# Patient Record
Sex: FEMALE | Race: WHITE | NOT HISPANIC OR LATINO | URBAN - METROPOLITAN AREA
[De-identification: names, ages, dates, MRNs, and addresses within clinical notes are randomized per-mention and may not be internally consistent; named-entity substitution may affect disease eponyms.]

---

## 2023-03-30 ENCOUNTER — TELEPHONE (OUTPATIENT)
Dept: PRIMARY CARE | Facility: CLINIC | Age: 63
End: 2023-03-30
Payer: MEDICARE

## 2023-03-30 DIAGNOSIS — I10 PRIMARY HYPERTENSION: Primary | ICD-10-CM

## 2023-03-30 RX ORDER — LISINOPRIL 5 MG/1
5 TABLET ORAL DAILY
Qty: 90 TABLET | Refills: 0 | Status: SHIPPED | OUTPATIENT
Start: 2023-03-30 | End: 2023-06-27 | Stop reason: SDUPTHER

## 2023-03-30 RX ORDER — LISINOPRIL 5 MG/1
5 TABLET ORAL DAILY
COMMUNITY
End: 2023-03-30 | Stop reason: SDUPTHER

## 2023-04-13 ENCOUNTER — TELEPHONE (OUTPATIENT)
Dept: PRIMARY CARE | Facility: CLINIC | Age: 63
End: 2023-04-13
Payer: MEDICARE

## 2023-04-13 DIAGNOSIS — F32.A DEPRESSION, UNSPECIFIED DEPRESSION TYPE: Primary | ICD-10-CM

## 2023-04-13 RX ORDER — BUPROPION HYDROCHLORIDE 150 MG/1
150 TABLET ORAL EVERY MORNING
Qty: 30 TABLET | Refills: 1 | Status: SHIPPED | OUTPATIENT
Start: 2023-04-13 | End: 2023-05-06

## 2023-04-13 NOTE — TELEPHONE ENCOUNTER
Patient has been feeling low energy and would like to discuss possibly starting medication Wellbutrin.    Please call [phone]

## 2023-04-25 ENCOUNTER — TELEPHONE (OUTPATIENT)
Dept: PRIMARY CARE | Facility: CLINIC | Age: 63
End: 2023-04-25
Payer: MEDICARE

## 2023-04-25 DIAGNOSIS — U07.1 COVID-19: Primary | ICD-10-CM

## 2023-04-25 NOTE — TELEPHONE ENCOUNTER
Travelling to [place] May 18th. Questions about updating the covid booster.  Advised that she could repeat her booster given that she is 63 and it has been 6 months since her last   Will have her travel with Paxlovid.  Counseled on getting up during the flight frequently and squeezing legs to prevent DVT.  She is feeling better on the Wellbutrin.  Better mood and energy. She feels that her sleep is little more disrupted though. Uses meditation and if needed a xanax.  We agreed to see if this works itself out as her body gets used to the Wellbutrin in a few weeks.  She feels comfortable with this plan

## 2023-05-06 DIAGNOSIS — F32.A DEPRESSION, UNSPECIFIED DEPRESSION TYPE: ICD-10-CM

## 2023-05-06 RX ORDER — BUPROPION HYDROCHLORIDE 150 MG/1
150 TABLET ORAL EVERY MORNING
Qty: 30 TABLET | Refills: 1 | Status: SHIPPED | OUTPATIENT
Start: 2023-05-06 | End: 2023-05-09

## 2023-05-09 ENCOUNTER — TELEPHONE (OUTPATIENT)
Dept: PRIMARY CARE | Facility: CLINIC | Age: 63
End: 2023-05-09
Payer: MEDICARE

## 2023-05-09 DIAGNOSIS — F32.A DEPRESSION, UNSPECIFIED DEPRESSION TYPE: Primary | ICD-10-CM

## 2023-05-09 RX ORDER — BUPROPION HYDROCHLORIDE 300 MG/1
300 TABLET ORAL EVERY MORNING
Qty: 30 TABLET | Refills: 1 | Status: SHIPPED | OUTPATIENT
Start: 2023-05-09 | End: 2023-06-01

## 2023-05-09 NOTE — TELEPHONE ENCOUNTER
Feels like she is back to where she was before starting the Wellbutrin. She is feeling down again. Her sleep did regulate and she is sleeping better. She leaves for [place] a week from Thursday.  She has been on 150 mg for about 4 weeks.  I am going to have her increase to 300 mg and asked that she call me early next week to see if she is tolerating the increased dose.

## 2023-06-01 DIAGNOSIS — F32.A DEPRESSION, UNSPECIFIED DEPRESSION TYPE: ICD-10-CM

## 2023-06-01 RX ORDER — BUPROPION HYDROCHLORIDE 300 MG/1
300 TABLET ORAL EVERY MORNING
Qty: 30 TABLET | Refills: 1 | Status: SHIPPED | OUTPATIENT
Start: 2023-06-01 | End: 2023-06-27

## 2023-06-26 PROBLEM — R73.02 GLUCOSE INTOLERANCE (IMPAIRED GLUCOSE TOLERANCE): Status: ACTIVE | Noted: 2023-06-26

## 2023-06-26 PROBLEM — K59.9 FUNCTIONAL BOWEL DISORDER: Status: ACTIVE | Noted: 2023-06-26

## 2023-06-26 PROBLEM — I10 BENIGN ESSENTIAL HYPERTENSION: Status: ACTIVE | Noted: 2023-06-26

## 2023-06-26 PROBLEM — H40.009 BORDERLINE GLAUCOMA: Status: ACTIVE | Noted: 2023-06-26

## 2023-06-26 PROBLEM — S99.921A INJURY OF FOOT, RIGHT: Status: RESOLVED | Noted: 2023-06-26 | Resolved: 2023-06-26

## 2023-06-26 PROBLEM — F51.02 INSOMNIA DUE TO STRESS: Status: ACTIVE | Noted: 2023-06-26

## 2023-06-26 PROBLEM — M81.0 OSTEOPOROSIS: Status: ACTIVE | Noted: 2023-06-26

## 2023-06-26 PROBLEM — F41.9 ANXIETY DISORDER: Status: RESOLVED | Noted: 2023-06-26 | Resolved: 2023-06-26

## 2023-06-26 RX ORDER — DENOSUMAB 60 MG/ML
60 INJECTION SUBCUTANEOUS ONCE
COMMUNITY
Start: 2019-09-16

## 2023-06-26 RX ORDER — VIT C/E/ZN/COPPR/LUTEIN/ZEAXAN 250MG-90MG
50 CAPSULE ORAL DAILY
COMMUNITY

## 2023-06-26 RX ORDER — ALPRAZOLAM 0.25 MG/1
0.25 TABLET ORAL NIGHTLY
COMMUNITY
End: 2023-06-27 | Stop reason: SDUPTHER

## 2023-06-26 NOTE — PROGRESS NOTES
"Rachael Meredith is a 63 y.o. female who presents Follow-up    HPI:   history of HTN, pre- glaucoma, osteoporosis ( follows with Dr. Mejia) , Functional bowl disorder with bloating, cramping and diarrhea, depression and anxiety  who is being seen for  Medication Management    On 5/9 telephone encounter   Feels like she is back to where she was before starting the Wellbutrin. She is feeling down again. Her sleep did regulate and she is sleeping better. She leaves for [place] a week from Thursday. She has been on 150 mg for about 4 weeks. I am going to have her increase to 300 mg and asked that she call me early next week to see if she is tolerating the increased dose.    Today she reports that Wellbutrin has been \"great for the depression\" but making the anxiety worse. Her friends have noticed.  She is reluctant to go off because it has really helped with the energy and depression.   She finds that she is preservating  more about things. She is still having some sleeping issues. She did not feel well on sertraline in the past.      Her bp at home has been around 127/80.      Her other issue is that lately she feels that she has to clear her throat more and feels like it's hard to take a deep breath outside. She does have more mucus in her sinuses.  She has had some mild hay fever for the past few years   She has not tried any otc medications  for these symptoms .    Problem List as of 6/27/2023 Never Reviewed      Benign essential hypertension    Borderline glaucoma    Glucose intolerance (impaired glucose tolerance)    Insomnia due to stress    Osteoporosis        Patient Active Problem List   Diagnosis    Benign essential hypertension    Borderline glaucoma    Glucose intolerance (impaired glucose tolerance)    Insomnia due to stress    Osteoporosis    Functional bowel disorder        Past Medical History:   Diagnosis Date    Herpes genitalis     History of torn meniscus of right knee     Unilateral primary " osteoarthritis, right knee         Past Surgical History:   Procedure Laterality Date    BREAST BIOPSY      Open     SECTION, CLASSIC      MOUTH SURGERY      Oral Surgery Tooth Extraction        Social History     Tobacco Use    Smoking status: Former     Types: Cigarettes     Quit date:      Years since quittin.5    Smokeless tobacco: Never         Current Outpatient Medications:     buPROPion XL (Wellbutrin XL) 300 mg 24 hr tablet, TAKE 1 TABLET (300 MG) BY MOUTH ONCE DAILY IN THE MORNING. DO NOT CRUSH, CHEW, OR SPLIT., Disp: 30 tablet, Rfl: 1    cholecalciferol (Vitamin D-3) 25 MCG (1000 UT) capsule, Take 2 capsules (50 mcg) by mouth once daily., Disp: , Rfl:     denosumab (Prolia) 60 mg/mL syringe, Inject 1 mL (60 mg) under the skin 1 time., Disp: , Rfl:     ALPRAZolam (Xanax) 0.25 mg tablet, Take 1 tablet (0.25 mg) by mouth as needed at bedtime for anxiety., Disp: 90 tablet, Rfl: 0    escitalopram (Lexapro) 5 mg tablet, Take 1 tablet (5 mg) by mouth once daily., Disp: 30 tablet, Rfl: 2    fexofenadine (Allegra) 180 mg tablet, Take 1 tablet (180 mg) by mouth once daily., Disp: 90 tablet, Rfl: 3    fluticasone (Flonase) 50 mcg/actuation nasal spray, Administer 1 spray into each nostril once daily. Shake gently. Before first use, prime pump. After use, clean tip and replace cap., Disp: 16 g, Rfl: 5    lisinopril 5 mg tablet, Take 1 tablet (5 mg) by mouth once daily., Disp: 90 tablet, Rfl: 0     Allergies   Allergen Reactions    Mirtazapine Other           /81 (BP Location: Left arm, Patient Position: Sitting) Comment: manual  Pulse 52  There is no height or weight on file to calculate BMI.     Physical Exam  Vitals reviewed.   Constitutional:       General: She is not in acute distress.     Appearance: Normal appearance. She is not ill-appearing or toxic-appearing.   HENT:      Right Ear: Tympanic membrane and external ear normal.      Left Ear: Tympanic membrane and external ear normal.       Mouth/Throat:      Mouth: Mucous membranes are moist.      Pharynx: No oropharyngeal exudate or posterior oropharyngeal erythema.   Eyes:      Conjunctiva/sclera: Conjunctivae normal.   Cardiovascular:      Rate and Rhythm: Normal rate and regular rhythm.      Heart sounds: No murmur heard.     No gallop.   Pulmonary:      Effort: Pulmonary effort is normal. No respiratory distress.      Breath sounds: Normal breath sounds. No wheezing, rhonchi or rales.   Musculoskeletal:         General: Normal range of motion.   Lymphadenopathy:      Head:      Right side of head: No submandibular adenopathy.      Left side of head: No submandibular adenopathy.      Cervical: No cervical adenopathy.   Skin:     Findings: No rash.   Neurological:      General: No focal deficit present.      Mental Status: She is alert.           Problem List Items Addressed This Visit    None  Visit Diagnoses       Anxiety    -  Primary    Relevant Medications    escitalopram (Lexapro) 5 mg tablet    ALPRAZolam (Xanax) 0.25 mg tablet    Primary hypertension        Relevant Medications    lisinopril 5 mg tablet    Seasonal allergic rhinitis due to pollen        Relevant Medications    fluticasone (Flonase) 50 mcg/actuation nasal spray    fexofenadine (Allegra) 180 mg tablet             Assessment/Plan   Will re challenge her with an SSRI and have her Start lexapro 5 mg daily in addition to her Wellbutrin  Continue lisinopril but check bp more regularly at home      Sharon Welch MD    TVT of 35 minutes with greater than 50% spent FTF in assessment/discussion and care coordination

## 2023-06-27 ENCOUNTER — OFFICE VISIT (OUTPATIENT)
Dept: PRIMARY CARE | Facility: CLINIC | Age: 63
End: 2023-06-27
Payer: MEDICARE

## 2023-06-27 VITALS — HEART RATE: 52 BPM | DIASTOLIC BLOOD PRESSURE: 81 MMHG | SYSTOLIC BLOOD PRESSURE: 141 MMHG

## 2023-06-27 DIAGNOSIS — I10 PRIMARY HYPERTENSION: ICD-10-CM

## 2023-06-27 DIAGNOSIS — F41.9 ANXIETY: Primary | ICD-10-CM

## 2023-06-27 DIAGNOSIS — F32.A DEPRESSION, UNSPECIFIED DEPRESSION TYPE: ICD-10-CM

## 2023-06-27 DIAGNOSIS — J30.1 SEASONAL ALLERGIC RHINITIS DUE TO POLLEN: ICD-10-CM

## 2023-06-27 PROCEDURE — 3077F SYST BP >= 140 MM HG: CPT | Performed by: INTERNAL MEDICINE

## 2023-06-27 PROCEDURE — 99214 OFFICE O/P EST MOD 30 MIN: CPT | Performed by: INTERNAL MEDICINE

## 2023-06-27 PROCEDURE — 3079F DIAST BP 80-89 MM HG: CPT | Performed by: INTERNAL MEDICINE

## 2023-06-27 PROCEDURE — 1159F MED LIST DOCD IN RCRD: CPT | Performed by: INTERNAL MEDICINE

## 2023-06-27 PROCEDURE — 1160F RVW MEDS BY RX/DR IN RCRD: CPT | Performed by: INTERNAL MEDICINE

## 2023-06-27 PROCEDURE — 1036F TOBACCO NON-USER: CPT | Performed by: INTERNAL MEDICINE

## 2023-06-27 RX ORDER — MINERAL OIL
180 ENEMA (ML) RECTAL DAILY
Qty: 90 TABLET | Refills: 3 | Status: SHIPPED | OUTPATIENT
Start: 2023-06-27 | End: 2024-04-16 | Stop reason: ALTCHOICE

## 2023-06-27 RX ORDER — BUPROPION HYDROCHLORIDE 300 MG/1
300 TABLET ORAL EVERY MORNING
Qty: 90 TABLET | Refills: 3 | Status: SHIPPED | OUTPATIENT
Start: 2023-06-27 | End: 2024-06-26

## 2023-06-27 RX ORDER — FLUTICASONE PROPIONATE 50 MCG
1 SPRAY, SUSPENSION (ML) NASAL DAILY
Qty: 16 G | Refills: 5 | Status: SHIPPED | OUTPATIENT
Start: 2023-06-27 | End: 2023-07-19

## 2023-06-27 RX ORDER — ALPRAZOLAM 0.25 MG/1
0.25 TABLET ORAL NIGHTLY PRN
Qty: 90 TABLET | Refills: 0 | Status: SHIPPED | OUTPATIENT
Start: 2023-06-27 | End: 2023-09-14 | Stop reason: SDUPTHER

## 2023-06-27 RX ORDER — LISINOPRIL 5 MG/1
5 TABLET ORAL DAILY
Qty: 90 TABLET | Refills: 0 | Status: SHIPPED | OUTPATIENT
Start: 2023-06-27 | End: 2023-07-03 | Stop reason: DRUGHIGH

## 2023-06-27 RX ORDER — ESCITALOPRAM OXALATE 5 MG/1
5 TABLET ORAL DAILY
Qty: 30 TABLET | Refills: 2 | Status: SHIPPED | OUTPATIENT
Start: 2023-06-27 | End: 2023-07-19

## 2023-06-27 NOTE — PATIENT INSTRUCTIONS
Call me in 2 weeks with an update on your blood pressure and the new medication ( escitalopram) for anxiety  Follow up with me in 3 months    For more your increased mucous I would have you take daily allegra and Flonase nasal spray for the next two weeks to see if it makes a difference. v

## 2023-07-03 ENCOUNTER — TELEPHONE (OUTPATIENT)
Dept: PRIMARY CARE | Facility: CLINIC | Age: 63
End: 2023-07-03
Payer: MEDICARE

## 2023-07-03 DIAGNOSIS — I10 PRIMARY HYPERTENSION: ICD-10-CM

## 2023-07-03 RX ORDER — LISINOPRIL 5 MG/1
10 TABLET ORAL DAILY
Qty: 90 TABLET | Refills: 0 | Status: SHIPPED | OUTPATIENT
Start: 2023-07-03 | End: 2023-08-15 | Stop reason: SDUPTHER

## 2023-07-03 NOTE — TELEPHONE ENCOUNTER
Patient has been monitoring BP readings.  She is concerned readings have been high.  158/101, 158/88, 156/95    Please call to discuss [phone]

## 2023-07-03 NOTE — TELEPHONE ENCOUNTER
Rachael has been taking her blood pressure and consistently her systolics have been in the 150s to low 160s and diastolics in the 80s or 90s.  She is currently on lisinopril 5 mg.  I have advised her to go up to 10 mg daily and to follow her blood pressure over the next couple weeks.  She will keep me posted on her response.  She has been taking Lexapro 5 mg daily in the morning with her Wellbutrin 300 mg.  She seems to be tolerating the Lexapro.  She is not sure if it is helping with her anxiety at all.  She also is not sleeping very well and will wake up with a queasy nauseous feeling.  She states she is never really slept well since her divorce a couple years ago.  I have encouraged her to stay on the Lexapro for now in the hopes that once her blood pressure is controlled we can increase the dose and her anxiety will be better controlled.  She is going to try to continue.

## 2023-07-11 ENCOUNTER — TELEPHONE (OUTPATIENT)
Dept: PRIMARY CARE | Facility: CLINIC | Age: 63
End: 2023-07-11
Payer: MEDICARE

## 2023-07-11 NOTE — TELEPHONE ENCOUNTER
No pain.  She will see if it continues and will follow up with her dentist for xray and potential oral surgeon consult.   She is tolerating the lexapro 5mg  with food better. Feels less anxious  Her BP has been better controlled other than today it was elevated.  This last week it is normal in the 120s.   She may be concerned about her jaw. I did suggest she take a xanax today. She will continue to monitor her bp

## 2023-07-11 NOTE — TELEPHONE ENCOUNTER
"Patient woke up this morning with \"jaw clicking\", possibly TMJ. Dentist is out of town until next week.    She would like to know if you have any recommendations?    Please call [phone]  "

## 2023-07-14 ENCOUNTER — TELEPHONE (OUTPATIENT)
Dept: PRIMARY CARE | Facility: CLINIC | Age: 63
End: 2023-07-14
Payer: MEDICARE

## 2023-07-14 NOTE — TELEPHONE ENCOUNTER
Rachael fell on her face while she was walking with a friend.  Called her nose is gushing.  She is applying ice and is seeking  on any further action she can do.  She also hit above her eyebrow.  No loss of consciousness.  Cosmetically she is okay with how her nose looks.  I advised she can use Afrin nasal spray continue ice and hold pressure.  To call if the bleeding does not stop.  Or call for any concerns.

## 2023-07-19 DIAGNOSIS — J30.1 SEASONAL ALLERGIC RHINITIS DUE TO POLLEN: ICD-10-CM

## 2023-07-19 DIAGNOSIS — F41.9 ANXIETY: ICD-10-CM

## 2023-07-19 RX ORDER — FLUTICASONE PROPIONATE 50 MCG
SPRAY, SUSPENSION (ML) NASAL
Qty: 16 ML | Refills: 5 | Status: SHIPPED | OUTPATIENT
Start: 2023-07-19 | End: 2023-07-24

## 2023-07-19 RX ORDER — ESCITALOPRAM OXALATE 5 MG/1
TABLET ORAL
Qty: 30 TABLET | Refills: 2 | Status: SHIPPED | OUTPATIENT
Start: 2023-07-19 | End: 2023-09-28 | Stop reason: SDUPTHER

## 2023-07-24 DIAGNOSIS — J30.1 SEASONAL ALLERGIC RHINITIS DUE TO POLLEN: ICD-10-CM

## 2023-07-24 RX ORDER — FLUTICASONE PROPIONATE 50 MCG
2 SPRAY, SUSPENSION (ML) NASAL DAILY
Qty: 16 ML | Refills: 5 | Status: SHIPPED | OUTPATIENT
Start: 2023-07-24 | End: 2024-04-16 | Stop reason: ALTCHOICE

## 2023-08-03 ENCOUNTER — TELEPHONE (OUTPATIENT)
Dept: PRIMARY CARE | Facility: CLINIC | Age: 63
End: 2023-08-03
Payer: MEDICARE

## 2023-08-03 DIAGNOSIS — Z12.31 ENCOUNTER FOR SCREENING MAMMOGRAM FOR MALIGNANT NEOPLASM OF BREAST: Primary | ICD-10-CM

## 2023-08-03 NOTE — TELEPHONE ENCOUNTER
When she checks her bp her first readings are in the 140s and it will usually subsequently go down after the 3rd or 4th try to systolic 120s.   She is tolerating her lisinopril 10 mg well.  No change to medications advised.

## 2023-08-15 DIAGNOSIS — I10 PRIMARY HYPERTENSION: ICD-10-CM

## 2023-08-15 RX ORDER — LISINOPRIL 10 MG/1
10 TABLET ORAL DAILY
Qty: 90 TABLET | Refills: 1 | Status: SHIPPED | OUTPATIENT
Start: 2023-08-15 | End: 2023-09-14 | Stop reason: SINTOL

## 2023-08-24 ENCOUNTER — TELEPHONE (OUTPATIENT)
Dept: PRIMARY CARE | Facility: CLINIC | Age: 63
End: 2023-08-24
Payer: MEDICARE

## 2023-08-24 DIAGNOSIS — R05.9 COUGH, UNSPECIFIED TYPE: Primary | ICD-10-CM

## 2023-08-24 RX ORDER — FAMOTIDINE 40 MG/1
40 TABLET, FILM COATED ORAL NIGHTLY
Qty: 30 TABLET | Refills: 0 | Status: SHIPPED | OUTPATIENT
Start: 2023-08-24 | End: 2023-09-14 | Stop reason: ALTCHOICE

## 2023-08-24 NOTE — TELEPHONE ENCOUNTER
"Cb and discussed   \"Awfully strange\"  Was having just a nocturnal cough - wakes up and coughs for awhile   Trying allegra and Flonase - no improvement  Feels a little phlegm - tries Mucinex  Some throat clearing   No marked heartburn   Will try the following   And stop the other meds - update us if not better through the weekend    Requested Prescriptions     Signed Prescriptions Disp Refills    famotidine (Pepcid) 40 mg tablet 30 tablet 0     Sig: Take 1 tablet (40 mg) by mouth once daily at bedtime.     Authorizing Provider: DION WILLIAMSON       "

## 2023-08-24 NOTE — TELEPHONE ENCOUNTER
Patient has  an on-going cough worse at night, no other symptoms.  She has been taking Allegra and Flonase PRN.    Patient would like to know if any other recommendations?  Please advise  [phone]

## 2023-08-30 ENCOUNTER — TELEPHONE (OUTPATIENT)
Dept: PRIMARY CARE | Facility: CLINIC | Age: 63
End: 2023-08-30
Payer: MEDICARE

## 2023-08-30 DIAGNOSIS — R05.9 COUGH, UNSPECIFIED TYPE: Primary | ICD-10-CM

## 2023-08-30 RX ORDER — AZELASTINE 1 MG/ML
1 SPRAY, METERED NASAL 2 TIMES DAILY
Qty: 30 ML | Refills: 0 | Status: SHIPPED | OUTPATIENT
Start: 2023-08-30 | End: 2023-10-10

## 2023-08-30 RX ORDER — BENZONATATE 100 MG/1
100 CAPSULE ORAL 3 TIMES DAILY PRN
Qty: 30 CAPSULE | Refills: 0 | Status: SHIPPED | OUTPATIENT
Start: 2023-08-30 | End: 2023-09-14 | Stop reason: ALTCHOICE

## 2023-08-30 RX ORDER — IPRATROPIUM BROMIDE 42 UG/1
2 SPRAY, METERED NASAL 2 TIMES DAILY
Qty: 15 ML | Refills: 0 | Status: SHIPPED | OUTPATIENT
Start: 2023-08-30 | End: 2023-09-22

## 2023-08-30 NOTE — TELEPHONE ENCOUNTER
Her cough persists despite the trial of Allegra and Flonase and now she has tried famotidine as well without any relief.  She describes the cough as not dry but more wet and productive.  She does have postnasal drainage she feels during the cough.  She is not sure if she has postnasal drainage in between her coughing fits.  Her cough is worse at night but she does have a few episodes during the day.  She does not feel ill and there is no fevers or chills.  We discussed the potential could be the lisinopril which she has hesitant to come off of because her blood pressure is controlled and she is tolerating it well.  She states she has not completely consistent with the Allegra and may have helped a little.  Given that information we will have her restart her Allegra more consistently we will have her stop her Flonase and try Astelin and ipratropium nasal spray.  I have asked her to give me a call in 2 weeks.  If no improvement in her cough I would like to have her come off her ACE inhibitor and we will put her on an ARB instead.

## 2023-08-30 NOTE — TELEPHONE ENCOUNTER
Patient spoke with dr. Posadas while you were away regarding ongoing cough.  She is still not any better with cough.    Please call  [phone]

## 2023-09-14 ENCOUNTER — OFFICE VISIT (OUTPATIENT)
Dept: PRIMARY CARE | Facility: CLINIC | Age: 63
End: 2023-09-14
Payer: MEDICARE

## 2023-09-14 VITALS
SYSTOLIC BLOOD PRESSURE: 121 MMHG | DIASTOLIC BLOOD PRESSURE: 76 MMHG | OXYGEN SATURATION: 99 % | TEMPERATURE: 98 F | HEART RATE: 62 BPM

## 2023-09-14 DIAGNOSIS — I10 PRIMARY HYPERTENSION: Primary | ICD-10-CM

## 2023-09-14 DIAGNOSIS — F41.9 ANXIETY: ICD-10-CM

## 2023-09-14 PROCEDURE — 1160F RVW MEDS BY RX/DR IN RCRD: CPT | Performed by: INTERNAL MEDICINE

## 2023-09-14 PROCEDURE — 3074F SYST BP LT 130 MM HG: CPT | Performed by: INTERNAL MEDICINE

## 2023-09-14 PROCEDURE — 1159F MED LIST DOCD IN RCRD: CPT | Performed by: INTERNAL MEDICINE

## 2023-09-14 PROCEDURE — 99213 OFFICE O/P EST LOW 20 MIN: CPT | Performed by: INTERNAL MEDICINE

## 2023-09-14 PROCEDURE — 1036F TOBACCO NON-USER: CPT | Performed by: INTERNAL MEDICINE

## 2023-09-14 PROCEDURE — 3078F DIAST BP <80 MM HG: CPT | Performed by: INTERNAL MEDICINE

## 2023-09-14 RX ORDER — CALCIUM CARBONATE/VITAMIN D3 500-10/5ML
LIQUID (ML) ORAL
COMMUNITY

## 2023-09-14 RX ORDER — LOSARTAN POTASSIUM 25 MG/1
25 TABLET ORAL DAILY
Qty: 30 TABLET | Refills: 0 | Status: SHIPPED | OUTPATIENT
Start: 2023-09-14 | End: 2023-09-28 | Stop reason: SDUPTHER

## 2023-09-14 RX ORDER — ALPRAZOLAM 0.25 MG/1
0.25 TABLET ORAL NIGHTLY PRN
Qty: 90 TABLET | Refills: 0 | Status: SHIPPED | OUTPATIENT
Start: 2023-09-14 | End: 2024-04-16 | Stop reason: SDUPTHER

## 2023-09-14 RX ORDER — MULTIVITAMIN
1 TABLET ORAL
COMMUNITY

## 2023-09-14 RX ORDER — VALACYCLOVIR HYDROCHLORIDE 1 G/1
TABLET, FILM COATED ORAL
COMMUNITY
Start: 2023-09-13

## 2023-09-14 NOTE — PROGRESS NOTES
Rachael Meredith is a 63 y.o. female who presents Cough (persistent)      HPI: Rachael presents for persistent cough and for medication management of her xanax.  She is doing quite well on the Lexapro 5 mg.  She has been able to decrease the amount of Xanax that she uses at night to sleep.  She is taking it now approximately 4 times a week.  However she has been more exhausted because of a cough has been keeping her up at night.  The nasal sprays and the Pepcid and the Tessalon have been of no relief.  She does feel that she has a lot of nasal congestion and drainage without any discoloration.  The drainage is thin.  She has no other allergic symptoms like itchy watery eyes.  No wheezing.    She is blowing her nose a lot and she has to clear her nose afterwards  Cough started 6 weeks ago and has gotten progressively worse.     4 nights a week will take a half.    Problem List as of 2023 Reviewed: 2023 10:55 AM by Sharon Welch MD      Benign essential hypertension    Borderline glaucoma    Glucose intolerance (impaired glucose tolerance)    Insomnia due to stress    Osteoporosis    Functional bowel disorder        Patient Active Problem List   Diagnosis    Benign essential hypertension    Borderline glaucoma    Glucose intolerance (impaired glucose tolerance)    Insomnia due to stress    Osteoporosis    Functional bowel disorder    Osteoporosis, post-menopausal        Past Medical History:   Diagnosis Date    Herpes genitalis     History of torn meniscus of right knee     Unilateral primary osteoarthritis, right knee         Past Surgical History:   Procedure Laterality Date    BREAST BIOPSY      Open     SECTION, CLASSIC      MOUTH SURGERY      Oral Surgery Tooth Extraction        Social History     Tobacco Use    Smoking status: Former     Types: Cigarettes     Quit date:      Years since quittin.7    Smokeless tobacco: Never         Current Outpatient Medications:     azelastine (Astelin) 137  mcg (0.1 %) nasal spray, Administer 1 spray into each nostril 2 times a day. Use in each nostril as directed, Disp: 30 mL, Rfl: 0    buPROPion XL (Wellbutrin XL) 300 mg 24 hr tablet, Take 1 tablet (300 mg) by mouth once daily in the morning. Do not crush, chew, or split., Disp: 90 tablet, Rfl: 3    calcium carbonate-vitamin D3 600 mg-10 mcg (400 unit) tablet, Take 1 tablet by mouth once daily., Disp: , Rfl:     cholecalciferol (Vitamin D-3) 25 MCG (1000 UT) capsule, Take 2 capsules (50 mcg) by mouth once daily., Disp: , Rfl:     denosumab (Prolia) 60 mg/mL syringe, Inject 1 mL (60 mg) under the skin 1 time., Disp: , Rfl:     escitalopram (Lexapro) 5 mg tablet, TAKE 1 TABLET BY MOUTH EVERY DAY, Disp: 30 tablet, Rfl: 2    fexofenadine (Allegra) 180 mg tablet, Take 1 tablet (180 mg) by mouth once daily., Disp: 90 tablet, Rfl: 3    fluticasone (Flonase) 50 mcg/actuation nasal spray, Administer 2 sprays into each nostril once daily., Disp: 16 mL, Rfl: 5    ipratropium (Atrovent) 42 mcg (0.06 %) nasal spray, Administer 2 sprays into each nostril 2 times a day for 19 days., Disp: 15 mL, Rfl: 0    magnesium oxide 400 mg magnesium capsule, Take by mouth once daily., Disp: , Rfl:     valACYclovir (Valtrex) 1 gram tablet, , Disp: , Rfl:     ALPRAZolam (Xanax) 0.25 mg tablet, Take 1 tablet (0.25 mg) by mouth as needed at bedtime for anxiety., Disp: 90 tablet, Rfl: 0    losartan (Cozaar) 25 mg tablet, Take 1 tablet (25 mg) by mouth once daily., Disp: 30 tablet, Rfl: 0     Allergies   Allergen Reactions    Mirtazapine Other       Review of Systems     /76 (BP Location: Left arm, Patient Position: Sitting)   Pulse 62   Temp 36.7 °C (98 °F)   SpO2 99%  There is no height or weight on file to calculate BMI.     Physical Exam  Constitutional:       Appearance: Normal appearance.   HENT:      Nose: Nose normal. No congestion or rhinorrhea.      Mouth/Throat:      Mouth: Mucous membranes are moist.      Pharynx: Oropharynx is  clear.   Eyes:      Extraocular Movements: Extraocular movements intact.      Conjunctiva/sclera: Conjunctivae normal.   Neck:      Thyroid: No thyroid mass, thyromegaly or thyroid tenderness.   Cardiovascular:      Rate and Rhythm: Normal rate and regular rhythm.   Pulmonary:      Effort: Pulmonary effort is normal.      Breath sounds: Normal breath sounds.   Musculoskeletal:      Cervical back: Normal range of motion and neck supple.   Lymphadenopathy:      Cervical:      Right cervical: No posterior cervical adenopathy.     Left cervical: No posterior cervical adenopathy.   Skin:     General: Skin is warm and dry.   Neurological:      Mental Status: She is alert.           Problem List Items Addressed This Visit    None  Visit Diagnoses       Primary hypertension    -  Primary    Relevant Medications    losartan (Cozaar) 25 mg tablet    Anxiety        Relevant Medications    ALPRAZolam (Xanax) 0.25 mg tablet             Assessment/Plan   Persistent cough now for at least 6 weeks.  It very well could be an ACE inhibitor related cough.  She does not appear to have any infection or lung process going on.  It is interesting because of the increased nasal congestion.  We will trial her off of the ACE inhibitor and put her on losartan.  She will follow her blood pressure closely at home and let me know in the next couple weeks how her blood pressure and cough are doing.  If despite coming off the lisinopril her cough persists will consider a CAT scan of her sinuses and chest x-ray.      Sharon Welch MD    TVT of 30 minutes with greater than 50% spent FTF in assessment/discussion and care coordination

## 2023-09-18 ENCOUNTER — APPOINTMENT (OUTPATIENT)
Dept: PRIMARY CARE | Facility: CLINIC | Age: 63
End: 2023-09-18
Payer: MEDICARE

## 2023-09-22 DIAGNOSIS — R05.9 COUGH, UNSPECIFIED TYPE: ICD-10-CM

## 2023-09-22 RX ORDER — IPRATROPIUM BROMIDE 42 UG/1
2 SPRAY, METERED NASAL 2 TIMES DAILY
Qty: 45 ML | Refills: 3 | Status: SHIPPED | OUTPATIENT
Start: 2023-09-22 | End: 2024-04-16 | Stop reason: ALTCHOICE

## 2023-09-28 ENCOUNTER — TELEPHONE (OUTPATIENT)
Dept: PRIMARY CARE | Facility: CLINIC | Age: 63
End: 2023-09-28
Payer: MEDICARE

## 2023-09-28 DIAGNOSIS — I10 PRIMARY HYPERTENSION: ICD-10-CM

## 2023-09-28 DIAGNOSIS — F41.9 ANXIETY: ICD-10-CM

## 2023-09-28 RX ORDER — ESCITALOPRAM OXALATE 5 MG/1
5 TABLET ORAL DAILY
Qty: 90 TABLET | Refills: 3 | Status: SHIPPED | OUTPATIENT
Start: 2023-09-28 | End: 2024-09-27

## 2023-09-28 RX ORDER — LOSARTAN POTASSIUM 25 MG/1
25 TABLET ORAL DAILY
Qty: 90 TABLET | Refills: 3 | Status: SHIPPED | OUTPATIENT
Start: 2023-09-28 | End: 2023-11-28 | Stop reason: ALTCHOICE

## 2023-09-28 NOTE — PROGRESS NOTES
The losartan seems to be controlling her blood pressure appropriately.  She is not sure if her cough was from the lisinopril because she still has it although it does seem improved.  It really has only been a couple weeks and the cough can take up to 4 weeks to improve so we will continue to follow her cough expectantly.  She also needs a refill of her Lexapro.  She has been feeling well on 5 mg along with her Wellbutrin 300 mg

## 2023-10-10 DIAGNOSIS — R05.9 COUGH, UNSPECIFIED TYPE: ICD-10-CM

## 2023-10-10 RX ORDER — AZELASTINE 1 MG/ML
1 SPRAY, METERED NASAL 2 TIMES DAILY
Qty: 30 ML | Refills: 3 | Status: SHIPPED | OUTPATIENT
Start: 2023-10-10 | End: 2023-10-11 | Stop reason: ALTCHOICE

## 2023-10-26 ENCOUNTER — TELEPHONE (OUTPATIENT)
Dept: PRIMARY CARE | Facility: CLINIC | Age: 63
End: 2023-10-26
Payer: MEDICARE

## 2023-10-26 DIAGNOSIS — M54.50 LUMBAR PAIN WITH RADIATION DOWN BOTH LEGS: Primary | ICD-10-CM

## 2023-10-26 DIAGNOSIS — M79.604 LUMBAR PAIN WITH RADIATION DOWN BOTH LEGS: Primary | ICD-10-CM

## 2023-10-26 DIAGNOSIS — M79.605 LUMBAR PAIN WITH RADIATION DOWN BOTH LEGS: Primary | ICD-10-CM

## 2023-10-26 RX ORDER — PREDNISONE 10 MG/1
TABLET ORAL
Qty: 32 TABLET | Refills: 0 | Status: SHIPPED | OUTPATIENT
Start: 2023-10-26 | End: 2023-11-28 | Stop reason: ALTCHOICE

## 2023-10-26 NOTE — TELEPHONE ENCOUNTER
Patient has been having leg pain x month since her fall.  She been having trouble walking.    She wants to discuss with you  Please call  [phone]

## 2023-10-26 NOTE — TELEPHONE ENCOUNTER
Rachael calls with complaints of lower back pain with bilateral radicular pain.  She reports pain in her butt cheeks with walking and which radiates into her hamstrings and into her calf behind her knee.  Her hamstrings also feel very tight.  Her symptoms are worse first thing in the morning and with prolonged sitting although they are constantly present.  She has tried to do exercises from [employer].  Her symptoms do respond to Advil and some degree.  She would like to get back to her normal exercise routine which is running.  Denies any bowel or bladder changes or overt weakness.  I would like her to start formal physical therapy we will get plain x-rays and do a prednisone taper.  If no improvement will bring her into the office for a more detailed exam and consider further imaging.  She will call with any worsening or new concerns to be seen sooner.

## 2023-10-27 ENCOUNTER — TELEPHONE (OUTPATIENT)
Dept: PRIMARY CARE | Facility: CLINIC | Age: 63
End: 2023-10-27
Payer: MEDICARE

## 2023-10-27 NOTE — TELEPHONE ENCOUNTER
She was concerned about side effects specifically bloating and gaining weight.  Was wondering whether she should change the regimen in any ways.  She is on a 15-day taper.  I advised that she should try it and if she starts getting side effects we can make adjustments but I like her to be on the longer taper given how long the process has been going on.  She is willing to give it a try and will keep in touch with me.

## 2023-10-31 ENCOUNTER — ANCILLARY PROCEDURE (OUTPATIENT)
Dept: RADIOLOGY | Facility: CLINIC | Age: 63
End: 2023-10-31
Payer: MEDICARE

## 2023-10-31 DIAGNOSIS — M79.605 LUMBAR PAIN WITH RADIATION DOWN BOTH LEGS: ICD-10-CM

## 2023-10-31 DIAGNOSIS — M79.604 LUMBAR PAIN WITH RADIATION DOWN BOTH LEGS: ICD-10-CM

## 2023-10-31 DIAGNOSIS — M54.50 LUMBAR PAIN WITH RADIATION DOWN BOTH LEGS: ICD-10-CM

## 2023-10-31 PROCEDURE — 72100 X-RAY EXAM L-S SPINE 2/3 VWS: CPT

## 2023-10-31 PROCEDURE — 72100 X-RAY EXAM L-S SPINE 2/3 VWS: CPT | Performed by: RADIOLOGY

## 2023-11-01 ENCOUNTER — TELEPHONE (OUTPATIENT)
Dept: PRIMARY CARE | Facility: CLINIC | Age: 63
End: 2023-11-01
Payer: MEDICARE

## 2023-11-01 NOTE — TELEPHONE ENCOUNTER
Patient cut her finger last night, wrapped it with a bandage and was still bleeding.    She would like to know if she needs to go to urgent care or how to wrap it properly.    Please call [phone]

## 2023-11-01 NOTE — TELEPHONE ENCOUNTER
Spoke with Rachael. Not bleeding through the bandage.  Shaved her tip off..  advised her to use a non stick dressing over the finger then pressure. Soap and water.  If if is not stopping bleeding advised  [facility] for dental sponge, clotting dressing.

## 2023-11-13 ENCOUNTER — ANCILLARY PROCEDURE (OUTPATIENT)
Dept: RADIOLOGY | Facility: CLINIC | Age: 63
End: 2023-11-13
Payer: MEDICARE

## 2023-11-13 DIAGNOSIS — M81.0 AGE-RELATED OSTEOPOROSIS WITHOUT CURRENT PATHOLOGICAL FRACTURE: ICD-10-CM

## 2023-11-13 PROCEDURE — 77080 DXA BONE DENSITY AXIAL: CPT | Mod: LIO | Performed by: RADIOLOGY

## 2023-11-13 PROCEDURE — 77080 DXA BONE DENSITY AXIAL: CPT | Mod: LIO

## 2023-11-27 ENCOUNTER — TELEPHONE (OUTPATIENT)
Dept: PRIMARY CARE | Facility: CLINIC | Age: 63
End: 2023-11-27
Payer: MEDICARE

## 2023-11-28 ENCOUNTER — LAB (OUTPATIENT)
Dept: LAB | Facility: LAB | Age: 63
End: 2023-11-28
Payer: MEDICARE

## 2023-11-28 ENCOUNTER — APPOINTMENT (OUTPATIENT)
Dept: PHYSICAL THERAPY | Facility: CLINIC | Age: 63
End: 2023-11-28
Payer: MEDICARE

## 2023-11-28 ENCOUNTER — OFFICE VISIT (OUTPATIENT)
Dept: PRIMARY CARE | Facility: CLINIC | Age: 63
End: 2023-11-28
Payer: MEDICARE

## 2023-11-28 VITALS
HEART RATE: 60 BPM | OXYGEN SATURATION: 98 % | DIASTOLIC BLOOD PRESSURE: 84 MMHG | TEMPERATURE: 97.2 F | SYSTOLIC BLOOD PRESSURE: 131 MMHG

## 2023-11-28 DIAGNOSIS — M79.10 MYALGIA: ICD-10-CM

## 2023-11-28 DIAGNOSIS — M79.10 MYALGIA: Primary | ICD-10-CM

## 2023-11-28 DIAGNOSIS — I10 BENIGN ESSENTIAL HYPERTENSION: ICD-10-CM

## 2023-11-28 LAB
ALBUMIN SERPL BCP-MCNC: 4.7 G/DL (ref 3.4–5)
ALP SERPL-CCNC: 32 U/L (ref 33–136)
ALT SERPL W P-5'-P-CCNC: 28 U/L (ref 7–45)
ANION GAP SERPL CALC-SCNC: 15 MMOL/L (ref 10–20)
AST SERPL W P-5'-P-CCNC: 21 U/L (ref 9–39)
BASOPHILS # BLD AUTO: 0.03 X10*3/UL (ref 0–0.1)
BASOPHILS NFR BLD AUTO: 0.5 %
BILIRUB SERPL-MCNC: 0.3 MG/DL (ref 0–1.2)
BUN SERPL-MCNC: 15 MG/DL (ref 6–23)
CALCIUM SERPL-MCNC: 9.9 MG/DL (ref 8.6–10.6)
CCP IGG SERPL-ACNC: <1 U/ML
CHLORIDE SERPL-SCNC: 96 MMOL/L (ref 98–107)
CK SERPL-CCNC: 133 U/L (ref 0–215)
CO2 SERPL-SCNC: 27 MMOL/L (ref 21–32)
CREAT SERPL-MCNC: 0.8 MG/DL (ref 0.5–1.05)
CRP SERPL-MCNC: <0.1 MG/DL
EOSINOPHIL # BLD AUTO: 0.11 X10*3/UL (ref 0–0.7)
EOSINOPHIL NFR BLD AUTO: 2 %
ERYTHROCYTE [DISTWIDTH] IN BLOOD BY AUTOMATED COUNT: 12.9 % (ref 11.5–14.5)
ERYTHROCYTE [SEDIMENTATION RATE] IN BLOOD BY WESTERGREN METHOD: 4 MM/H (ref 0–30)
GFR SERPL CREATININE-BSD FRML MDRD: 82 ML/MIN/1.73M*2
GLUCOSE SERPL-MCNC: 95 MG/DL (ref 74–99)
HCT VFR BLD AUTO: 40.2 % (ref 36–46)
HGB BLD-MCNC: 13.3 G/DL (ref 12–16)
IMM GRANULOCYTES # BLD AUTO: 0.01 X10*3/UL (ref 0–0.7)
IMM GRANULOCYTES NFR BLD AUTO: 0.2 % (ref 0–0.9)
LYMPHOCYTES # BLD AUTO: 1.35 X10*3/UL (ref 1.2–4.8)
LYMPHOCYTES NFR BLD AUTO: 24.7 %
MCH RBC QN AUTO: 28.5 PG (ref 26–34)
MCHC RBC AUTO-ENTMCNC: 33.1 G/DL (ref 32–36)
MCV RBC AUTO: 86 FL (ref 80–100)
MONOCYTES # BLD AUTO: 0.54 X10*3/UL (ref 0.1–1)
MONOCYTES NFR BLD AUTO: 9.9 %
NEUTROPHILS # BLD AUTO: 3.43 X10*3/UL (ref 1.2–7.7)
NEUTROPHILS NFR BLD AUTO: 62.7 %
NRBC BLD-RTO: 0 /100 WBCS (ref 0–0)
PLATELET # BLD AUTO: 276 X10*3/UL (ref 150–450)
POTASSIUM SERPL-SCNC: 4.5 MMOL/L (ref 3.5–5.3)
PROT SERPL-MCNC: 6.7 G/DL (ref 6.4–8.2)
PROT SERPL-MCNC: 6.7 G/DL (ref 6.4–8.2)
RBC # BLD AUTO: 4.67 X10*6/UL (ref 4–5.2)
RHEUMATOID FACT SER NEPH-ACNC: <10 IU/ML (ref 0–15)
SODIUM SERPL-SCNC: 133 MMOL/L (ref 136–145)
TSH SERPL-ACNC: 1.75 MIU/L (ref 0.44–3.98)
WBC # BLD AUTO: 5.5 X10*3/UL (ref 4.4–11.3)

## 2023-11-28 PROCEDURE — 3079F DIAST BP 80-89 MM HG: CPT | Performed by: INTERNAL MEDICINE

## 2023-11-28 PROCEDURE — 82550 ASSAY OF CK (CPK): CPT

## 2023-11-28 PROCEDURE — 3075F SYST BP GE 130 - 139MM HG: CPT | Performed by: INTERNAL MEDICINE

## 2023-11-28 PROCEDURE — 36415 COLL VENOUS BLD VENIPUNCTURE: CPT

## 2023-11-28 PROCEDURE — 85652 RBC SED RATE AUTOMATED: CPT

## 2023-11-28 PROCEDURE — 86334 IMMUNOFIX E-PHORESIS SERUM: CPT

## 2023-11-28 PROCEDURE — 86320 SERUM IMMUNOELECTROPHORESIS: CPT | Performed by: STUDENT IN AN ORGANIZED HEALTH CARE EDUCATION/TRAINING PROGRAM

## 2023-11-28 PROCEDURE — 84165 PROTEIN E-PHORESIS SERUM: CPT | Performed by: STUDENT IN AN ORGANIZED HEALTH CARE EDUCATION/TRAINING PROGRAM

## 2023-11-28 PROCEDURE — 86200 CCP ANTIBODY: CPT

## 2023-11-28 PROCEDURE — 82085 ASSAY OF ALDOLASE: CPT

## 2023-11-28 PROCEDURE — 86140 C-REACTIVE PROTEIN: CPT

## 2023-11-28 PROCEDURE — 84165 PROTEIN E-PHORESIS SERUM: CPT

## 2023-11-28 PROCEDURE — 80053 COMPREHEN METABOLIC PANEL: CPT

## 2023-11-28 PROCEDURE — 85025 COMPLETE CBC W/AUTO DIFF WBC: CPT

## 2023-11-28 PROCEDURE — 1159F MED LIST DOCD IN RCRD: CPT | Performed by: INTERNAL MEDICINE

## 2023-11-28 PROCEDURE — 84443 ASSAY THYROID STIM HORMONE: CPT

## 2023-11-28 PROCEDURE — 1160F RVW MEDS BY RX/DR IN RCRD: CPT | Performed by: INTERNAL MEDICINE

## 2023-11-28 PROCEDURE — 86431 RHEUMATOID FACTOR QUANT: CPT

## 2023-11-28 PROCEDURE — 86038 ANTINUCLEAR ANTIBODIES: CPT

## 2023-11-28 PROCEDURE — 99214 OFFICE O/P EST MOD 30 MIN: CPT | Performed by: INTERNAL MEDICINE

## 2023-11-28 PROCEDURE — 1036F TOBACCO NON-USER: CPT | Performed by: INTERNAL MEDICINE

## 2023-11-28 RX ORDER — LOSARTAN POTASSIUM 50 MG/1
50 TABLET ORAL DAILY
Qty: 90 TABLET | Refills: 3 | Status: SHIPPED | OUTPATIENT
Start: 2023-11-28 | End: 2024-11-27

## 2023-11-28 NOTE — PROGRESS NOTES
"Subjective   Patient ID: Rachael Meredith is a 63 y.o. female who presents for Back Pain.    HPI  Presents for back pain   Fell while running in June/July  and after her facial trauma healed she noticed that her lower back was bothering in August.  Her pain moves in different places.  Some days better than others.  Movement helps.  Wakes up in am and feels \"crippled\".  She always has an underlying pain.  Posterior hips/ Pelvic area. Working on Core helps.  After she walks the dog she starts to feeling more functional.  Lower back and on side she has a \"trigger point\" on left side of shoulder area but both tender.  Sometimes whole neck bothers her.   On 10/26 Rachael called with complaints of lower back pain with bilateral radicular pain.  She reported pain in her butt cheeks with walking and which radiates into her hamstrings and into her calf behind her knee.  Her hamstrings also feel very tight.  Her symptoms worse first thing in the morning and with prolonged sitting although they are constantly present.  She has tried to do exercises from [employer].  Her symptoms do respond to Advil and some degree.  She is most concerned about not being able to get back to her normal exercise routine which is running.  Denies any bowel or bladder changes or overt weakness.  At that time I asked her to  to start formal physical therapy we  got plain x-rays which showed min. Arthritic changes.   She was started on  a prednisone taper which helped immediately  while she was taking it but now she continues to have pain but in different areas and needed to stop the taper due to feeling more agitated on it.  She has been off prednisone now for about 4 weeks.  She presents today for a more detailed exam and to consider further imaging and testing.      Also her Bp has been high at home until she can get it to 132/83.      Review of Systems    Objective   /84   Pulse 60   Temp 36.2 °C (97.2 °F)   SpO2 98%     Physical " Exam  Constitutional:       Appearance: Normal appearance.   HENT:      Head: Normocephalic and atraumatic.      Comments: No Temporal tenderness or pulsations   Eyes:      Extraocular Movements: Extraocular movements intact.      Conjunctiva/sclera: Conjunctivae normal.   Cardiovascular:      Rate and Rhythm: Normal rate and regular rhythm.      Heart sounds: Normal heart sounds.   Pulmonary:      Effort: Pulmonary effort is normal.      Breath sounds: Normal breath sounds and air entry.   Musculoskeletal:      Right shoulder: Tenderness present. Normal strength.      Left shoulder: Tenderness present. Normal strength.        Arms:       Cervical back: Normal range of motion and neck supple.      Lumbar back: Normal. Negative right straight leg raise test and negative left straight leg raise test.        Back:       Right hip: Normal. Normal range of motion.      Left hip: Normal. Normal range of motion.      Comments: Areas of tenderness    Skin:     Findings: No rash.   Neurological:      General: No focal deficit present.      Mental Status: She is alert.      Cranial Nerves: Cranial nerves 2-12 are intact.      Sensory: Sensation is intact.      Motor: Motor function is intact.      Gait: Gait is intact.      Deep Tendon Reflexes: Reflexes are normal and symmetric.           Assessment/Plan   As discussed we are evaluating you for a condition called PMR  Please obtain lab work today to help with that diagnosis    Your blood pressure is above goal of Less than 130/80  consistently. Increase your Losartan to 50 mg    Will follow up by phone after your lab work comes in.   Problem List Items Addressed This Visit             ICD-10-CM    Benign essential hypertension I10    Relevant Medications    losartan (Cozaar) 50 mg tablet    Myalgia - Primary M79.10    Relevant Orders    Comprehensive metabolic panel    Sedimentation rate, automated    C-reactive protein    CBC and Auto Differential    Rheumatoid factor     Tsh With Reflex To Free T4 If Abnormal    Citrulline Antibody, IgG    AMADOU with Reflex to RACHEL    Serum Protein Electrophoresis    CK    Aldolase

## 2023-11-28 NOTE — PATIENT INSTRUCTIONS
As discussed we are evaluating you for a condition called PMR  Please obtain lab work today to help with that diagnosis    Your blood pressure is above goal of Less than 130/80  consistently. Increase your Losartan to 50 mg    Will follow up by phone after your lab work comes in.

## 2023-11-30 LAB — ALDOLASE SERPL-CCNC: 2.7 U/L (ref 1.2–7.6)

## 2023-12-01 ENCOUNTER — TELEPHONE (OUTPATIENT)
Dept: PRIMARY CARE | Facility: CLINIC | Age: 63
End: 2023-12-01
Payer: MEDICARE

## 2023-12-01 DIAGNOSIS — M54.17 LUMBOSACRAL RADICULOPATHY: Primary | ICD-10-CM

## 2023-12-01 NOTE — TELEPHONE ENCOUNTER
Reviewed lab results with Rachael which does not support a diagnosis of PMR.  Despite 6 weeks of physical therapy and compliance with her exercises and stretches, and despite anti-inflammatory she is considered to have a significant amount of discomfort that is limiting her ability to exercise and do her regular routine.  At this point I think it is indicated to go ahead and get an MRI to further evaluate the cause of her back and leg pain.

## 2023-12-03 LAB — ANA SER QL HEP2 SUBST: NEGATIVE

## 2023-12-05 ENCOUNTER — APPOINTMENT (OUTPATIENT)
Dept: PHYSICAL THERAPY | Facility: CLINIC | Age: 63
End: 2023-12-05
Payer: MEDICARE

## 2023-12-05 LAB
ALBUMIN: 4.4 G/DL (ref 3.4–5)
ALPHA 1 GLOBULIN: 0.2 G/DL (ref 0.2–0.6)
ALPHA 2 GLOBULIN: 0.7 G/DL (ref 0.4–1.1)
BETA GLOBULIN: 0.7 G/DL (ref 0.5–1.2)
GAMMA GLOBULIN: 0.6 G/DL (ref 0.5–1.4)
IMMUNOFIXATION COMMENT: NORMAL
M-PROTEIN 1: 0 G/DL
PATH REVIEW - SERUM IMMUNOFIXATION: NORMAL
PATH REVIEW-SERUM PROTEIN ELECTROPHORESIS: NORMAL
PROTEIN ELECTROPHORESIS COMMENT: NORMAL

## 2023-12-15 ENCOUNTER — HOSPITAL ENCOUNTER (OUTPATIENT)
Dept: RADIOLOGY | Facility: HOSPITAL | Age: 63
Discharge: HOME | End: 2023-12-15
Payer: MEDICARE

## 2023-12-15 DIAGNOSIS — M54.17 LUMBOSACRAL RADICULOPATHY: ICD-10-CM

## 2023-12-15 PROCEDURE — 72148 MRI LUMBAR SPINE W/O DYE: CPT

## 2023-12-16 DIAGNOSIS — M54.16 LUMBAR RADICULOPATHY: Primary | ICD-10-CM

## 2023-12-16 NOTE — PROGRESS NOTES
Continues with significant pain in L3 area.  Has failed PT. Will refer to Pain management for GIOVANA and further management

## 2024-01-03 ENCOUNTER — TELEPHONE (OUTPATIENT)
Dept: PRIMARY CARE | Facility: CLINIC | Age: 64
End: 2024-01-03
Payer: MEDICARE

## 2024-01-04 ENCOUNTER — OFFICE VISIT (OUTPATIENT)
Dept: PAIN MEDICINE | Facility: HOSPITAL | Age: 64
End: 2024-01-04
Payer: MEDICARE

## 2024-01-04 DIAGNOSIS — M54.16 LUMBAR RADICULOPATHY: ICD-10-CM

## 2024-01-04 PROCEDURE — 99204 OFFICE O/P NEW MOD 45 MIN: CPT | Performed by: ANESTHESIOLOGY

## 2024-01-04 PROCEDURE — 99214 OFFICE O/P EST MOD 30 MIN: CPT | Performed by: ANESTHESIOLOGY

## 2024-01-04 PROCEDURE — 1125F AMNT PAIN NOTED PAIN PRSNT: CPT | Performed by: ANESTHESIOLOGY

## 2024-01-04 PROCEDURE — 1036F TOBACCO NON-USER: CPT | Performed by: ANESTHESIOLOGY

## 2024-01-04 RX ORDER — GABAPENTIN 300 MG/1
CAPSULE ORAL
Qty: 90 CAPSULE | Refills: 2 | Status: SHIPPED | OUTPATIENT
Start: 2024-01-04

## 2024-01-04 ASSESSMENT — PAIN SCALES - GENERAL: PAINLEVEL: 5

## 2024-01-04 NOTE — PROGRESS NOTES
Chief Complaint   Patient presents with    Back Pain    Leg Pain     Subjective   Patient ID: Rachael Meredith is a 63 y.o. female with a past medical history of Hypertension, glaucoma, prediabetes, osteoporosis, anxiety who is seen as a new patient in clinic for evaluation of back and leg pain.     HPI:   She was referred by her primary care doctor for evaluation of back pain that has persisted despite more than 6 weeks of physical therapy in the past 6 months.  Last July she had a fall while running and sustained injuries to her face and back.  She had some back pain during that time which exacerbated in August.  She describes lower back pain that radiates to bilateral posterior thighs and intermittently into her calves, though this has been less of a problem lately.    Her pain is exacerbated with walking and running, lifting to some degree, sitting, and transitioning from sitting to standing, and rolling over in bed at night.  The pain is not made worse with truncal flexion or extension.  Denies any significant accidents.    Physical Therapy: The patient has done six or more weeks of physical therapy in the past six months with minimal improvement  Other Conservative Measures she has tried: Heating Pad and Ice  Classes of medications tried in the past: Acetaminophen, NSAIDs, and oral steroids    Last Urine Drug Screen:  Recent Results (from the past 8760 hour(s))   OPIATE/OPIOID/BENZO PRESCRIPTION COMPLIANCE    Collection Time: 02/03/23 10:10 AM   Result Value Ref Range    DRUG SCREEN COMMENT URINE SEE BELOW     Creatine, Urine 82.2 mg/dL    Amphetamine Screen, Urine PRESUMPTIVE NEGATIVE NEGATIVE    Barbiturate Screen, Urine PRESUMPTIVE NEGATIVE NEGATIVE    Cannabinoid Screen, Urine PRESUMPTIVE NEGATIVE NEGATIVE    Cocaine Screen, Urine PRESUMPTIVE NEGATIVE NEGATIVE    PCP Screen, Urine PRESUMPTIVE NEGATIVE NEGATIVE    7-Aminoclonazepam <25 Cutoff <25 ng/mL    Alpha-Hydroxyalprazolam <25 Cutoff <25 ng/mL     Alpha-Hydroxymidazolam <25 Cutoff <25 ng/mL    Alprazolam <25 Cutoff <25 ng/mL    Chlordiazepoxide <25 Cutoff <25 ng/mL    Clonazepam <25 Cutoff <25 ng/mL    Diazepam <25 Cutoff <25 ng/mL    Lorazepam <25 Cutoff <25 ng/mL    Midazolam <25 Cutoff <25 ng/mL    Nordiazepam <25 Cutoff <25 ng/mL    Oxazepam <25 Cutoff <25 ng/mL    Temazepam <25 Cutoff <25 ng/mL    Zolpidem <25 Cutoff <25 ng/mL    Zolpidem Metabolite (ZCA) <25 Cutoff <25 ng/mL    6-Acetylmorphine <25 Cutoff <25 ng/mL    Codeine <50 Cutoff <50 ng/mL    Hydrocodone <25 Cutoff <25 ng/mL    Hydromorphone <25 Cutoff <25 ng/mL    Morphine Urine <50 Cutoff <50 ng/mL    Norhydrocodone <25 Cutoff <25 ng/mL    Noroxycodone <25 Cutoff <25 ng/mL    Oxycodone <25 Cutoff <25 ng/mL    Oxymorphone <25 Cutoff <25 ng/mL    Tramadol 906 (A) Cutoff <50 ng/mL    O-Desmethyltramadol >1000 (A) Cutoff <50 ng/mL    Fentanyl <2.5 Cutoff<2.5 ng/mL    Norfentanyl <2.5 Cutoff<2.5 ng/mL    METHADONE CONFIRMATION,URINE <25 Cutoff <25 ng/mL    EDDP <25 Cutoff <25 ng/mL       Review of Systems   13-point ROS done and negative except for HPI.     Current Outpatient Medications   Medication Instructions    ALPRAZolam (XANAX) 0.25 mg, oral, Nightly PRN    buPROPion XL (WELLBUTRIN XL) 300 mg, oral, Every morning, Do not crush, chew, or split.    calcium carbonate-vitamin D3 600 mg-10 mcg (400 unit) tablet 1 tablet, oral, Daily RT    cholecalciferol (VITAMIN D-3) 50 mcg, oral, Daily    escitalopram (LEXAPRO) 5 mg, oral, Daily    fexofenadine (ALLEGRA) 180 mg, oral, Daily    fluticasone (Flonase) 50 mcg/actuation nasal spray 2 sprays, Each Nostril, Daily    gabapentin (Neurontin) 300 mg capsule Day 1-5 300mg at bedtime, Day 6-10 600mg at bedtime; if needed, increase on Day 11 and after to 900mg at bedtime. Decrease one tablet at a time if excessively drowsy or fatigued.    ipratropium (Atrovent) 42 mcg (0.06 %) nasal spray 2 sprays, Each Nostril, 2 times daily    losartan (COZAAR) 50 mg, oral,  Daily    magnesium oxide 400 mg magnesium capsule oral, Daily RT    Prolia 60 mg, subcutaneous, Once    valACYclovir (Valtrex) 1 gram tablet        Past Medical History:   Diagnosis Date    Herpes genitalis     History of torn meniscus of right knee     Unilateral primary osteoarthritis, right knee         Past Surgical History:   Procedure Laterality Date    BREAST BIOPSY      Open     SECTION, CLASSIC      MOUTH SURGERY      Oral Surgery Tooth Extraction        No family history on file.     Allergies   Allergen Reactions    Mirtazapine Other        Objective     There were no vitals filed for this visit.     Physical Exam  General: NAD, well groomed, well nourished  Eyes: Non-icteric sclera, EOMI  Ears, Nose, Mouth, and Throat: External ears and nose appear to be without deformity or rash. No lesions or masses noted. Hearing is grossly intact.   Neck: Trachea midline  Respiratory: Nonlabored breathing   Cardiovascular: no peripheral edema   Skin: No rashes or open lesions/ulcers identified on skin.    Back:   Palpation: No tenderness to palpation over lumbar paraspinous muscles.   Straight leg raise: does not reproduce their pain, bilaterally   SI Joint: Pain with Gaenselen, Pain with ELVIE, Pain with Yeoman's , Matthew finger sign present, and on the LEFT    Hip: No pain over greater trochanters. and Pain not reproduced with hip internal/external rotation.     Neurologic:   Cranial nerves grossly intact.   Strength 5/5 and symmetric plantar/dorsiflexion   Sensation: Normal to light touch throughout  DTRs:normal and symmetric throughout  Hernandez: absent  Clonus: absent    Psychiatric: Alert, orientation to person, place, and time. Cooperative.    Imaging personally reviewed and independently interpreted: Lumbar MRI shows disc bulges at L3/4 and L4/5, grade 1 anterolisthesis of L3 on L4 mild left L3 foraminal narrowing moderate central canal stenosis at L3/4 with a disc hyperintensity zone at that level on  the right.     Assessment/Plan   63-year-old female with lumbar radiculitis due to L3-4 disc herniation and hyperintensity zone more prominent on the left on MRI.  She also has a component of left sacroiliac joint dysfunction.  This pain is severely limiting her ability to engage in her hobbies which include running and other forms of exercise and is causing her to be depressed due to her feelings of lack of activity.  She has failed physical therapy as well as multiple classes of medications.    Plan:  -Gabapentin 300 mg 1 to 2 capsules nightly.  Side effect profile and risk reviewed.  -Start with transforaminal epidural steroid injection; if no benefit or still residual pain will pursue left-sided sacroiliac joint injection.  Procedure, risk, benefits, alternatives reviewed with the patient.  -Encouraged to keep up with physical therapy and core strengthening exercise program.    Planned Procedure: Left L3 transforaminal epidural steroid injection  The patient has failed treatment with : Physical therapy , alternative therapies, have significant limitations of their quality of life due to the pain, and have significant impairments of their instrumental activities of daily living (IADLs) due to the pain    We discussed  the risks, benefits and alternatives of the procedure including but not limited to: , Paralysis, Epidural hematoma, Post-dural puncture headache, Lack of efficacy , Transiently worsening pain , Bleeding, Infection , Nerve Damage, and The patient or her decision-maker expressed understanding and agreed to proceed. All questions were answered to the best of my ability.     Follow up: After procedure    The patient was invited to contact us back anytime with any questions or concerns and follow-up with us in the office as needed.     Diagnoses and all orders for this visit:  Lumbar radiculopathy  -     Referral to Pain Medicine  -     [place] pain management TC; Future  -     Transforaminal; Future  -      gabapentin (Neurontin) 300 mg capsule; Day 1-5 300mg at bedtime, Day 6-10 600mg at bedtime; if needed, increase on Day 11 and after to 900mg at bedtime. Decrease one tablet at a time if excessively drowsy or fatigued.    This note was generated with the aid of dictation software, there may be typos despite my attempts at proofreading.

## 2024-01-12 ENCOUNTER — HOSPITAL ENCOUNTER (OUTPATIENT)
Dept: RADIOLOGY | Facility: HOSPITAL | Age: 64
Discharge: HOME | End: 2024-01-12
Payer: MEDICARE

## 2024-01-12 VITALS
SYSTOLIC BLOOD PRESSURE: 134 MMHG | RESPIRATION RATE: 18 BRPM | DIASTOLIC BLOOD PRESSURE: 64 MMHG | TEMPERATURE: 98.1 F | HEIGHT: 62 IN | OXYGEN SATURATION: 100 % | WEIGHT: 108 LBS | HEART RATE: 61 BPM | BODY MASS INDEX: 19.88 KG/M2

## 2024-01-12 DIAGNOSIS — M54.16 LUMBAR RADICULOPATHY: ICD-10-CM

## 2024-01-12 PROCEDURE — 64483 NJX AA&/STRD TFRM EPI L/S 1: CPT | Mod: 50 | Performed by: ANESTHESIOLOGY

## 2024-01-12 PROCEDURE — 77003 FLUOROGUIDE FOR SPINE INJECT: CPT

## 2024-01-12 PROCEDURE — 2500000004 HC RX 250 GENERAL PHARMACY W/ HCPCS (ALT 636 FOR OP/ED): Performed by: ANESTHESIOLOGY

## 2024-01-12 PROCEDURE — 64483 NJX AA&/STRD TFRM EPI L/S 1: CPT | Performed by: ANESTHESIOLOGY

## 2024-01-12 RX ORDER — MIDAZOLAM HYDROCHLORIDE 1 MG/ML
INJECTION INTRAMUSCULAR; INTRAVENOUS
Status: COMPLETED | OUTPATIENT
Start: 2024-01-12 | End: 2024-01-12

## 2024-01-12 RX ADMIN — MIDAZOLAM HYDROCHLORIDE 2 MG: 1 INJECTION INTRAMUSCULAR; INTRAVENOUS at 08:47

## 2024-01-12 ASSESSMENT — PAIN - FUNCTIONAL ASSESSMENT
PAIN_FUNCTIONAL_ASSESSMENT: 0-10

## 2024-01-12 ASSESSMENT — PAIN SCALES - GENERAL
PAINLEVEL_OUTOF10: 0 - NO PAIN
PAINLEVEL_OUTOF10: 7
PAINLEVEL_OUTOF10: 0 - NO PAIN

## 2024-01-12 NOTE — H&P
History Of Present Illness  Rachael Meredith is a 63 y.o. female presents for procedure state below. Endorses no changes in past medical history or medical health since last seen in clinic.     She stopped taking her gabapentin due to side effects--insomnia. She feels her pain more on the left, but is now getting pain on the right as well.     Past Medical History  She has a past medical history of Herpes genitalis, History of torn meniscus of right knee, and Unilateral primary osteoarthritis, right knee.    Surgical History  She has a past surgical history that includes  section, classic; Mouth surgery; and Breast biopsy.     Social History  She reports that she quit smoking about 42 years ago. Her smoking use included cigarettes. She has never used smokeless tobacco. No history on file for alcohol use and drug use.    Family History  No family history on file.     Allergies  Mirtazapine    Review of Symptoms:   Constitutional: Negative for chills, diaphoresis or fever  HENT: Negative for neck swelling  Eyes:.  Negative for eye pain  Respiratory:.  Negative for cough, shortness of breath or wheezing    Cardiovascular:.  Negative for chest pain or palpitations  Gastrointestinal:.  Negative for abdominal pain, nausea and vomiting  Genitourinary:.  Negative for urgency  Musculoskeletal:  Positive for back pain. Positive for joint pain. Denies falls within the past 3 months.  Skin: Negative for wounds or itching   Neurological: Negative for dizziness, seizures, loss of consciousness and weakness  Endo/Heme/Allergies: Does not bruise/bleed easily  Psychiatric/Behavioral: Negative for depression. The patient does not appear anxious.       PHYSICAL EXAM  Vitals signs reviewed  Constitutional:       General: Not in acute distress     Appearance: Normal appearance. Not ill-appearing.  HENT:     Head: Normocephalic and atraumatic  Eyes:     Conjunctiva/sclera: Conjunctivae normal  Cardiovascular:     Rate and Rhythm: Normal  rate and regular rhythm  Pulmonary:     Effort: No respiratory distress  Abdominal:     Palpations: Abdomen is soft  Musculoskeletal: ORELLANA  Skin:     General: Skin is warm and dry  Neurological:     General: No focal deficit present  Psychiatric:         Mood and Affect: Mood normal         Behavior: Behavior normal     Last Recorded Vitals  There were no vitals taken for this visit.    Relevant Results  Current Outpatient Medications   Medication Instructions    ALPRAZolam (XANAX) 0.25 mg, oral, Nightly PRN    buPROPion XL (WELLBUTRIN XL) 300 mg, oral, Every morning, Do not crush, chew, or split.    calcium carbonate-vitamin D3 600 mg-10 mcg (400 unit) tablet 1 tablet, oral, Daily RT    cholecalciferol (VITAMIN D-3) 50 mcg, oral, Daily    escitalopram (LEXAPRO) 5 mg, oral, Daily    fexofenadine (ALLEGRA) 180 mg, oral, Daily    fluticasone (Flonase) 50 mcg/actuation nasal spray 2 sprays, Each Nostril, Daily    gabapentin (Neurontin) 300 mg capsule Day 1-5 300mg at bedtime, Day 6-10 600mg at bedtime; if needed, increase on Day 11 and after to 900mg at bedtime. Decrease one tablet at a time if excessively drowsy or fatigued.    ipratropium (Atrovent) 42 mcg (0.06 %) nasal spray 2 sprays, Each Nostril, 2 times daily    losartan (COZAAR) 50 mg, oral, Daily    magnesium oxide 400 mg magnesium capsule oral, Daily RT    Prolia 60 mg, subcutaneous, Once    valACYclovir (Valtrex) 1 gram tablet          MR lumbar spine wo IV contrast 12/15/2023    Narrative  Interpreted By:  Julio Cesar Fox,  STUDY:  MR LUMBAR SPINE WO IV CONTRAST; 12/15/2023 11:24 am    INDICATION:  persistent leg pain and lower back pain;    COMPARISON:  None available.    ACCESSION NUMBER(S):  [id number]    ORDERING CLINICIAN:  DAYNE POP    TECHNIQUE:  Routine MRI of the lumbar spine was performed without contrast    FINDINGS:  RESULT:  Counting reference: Lumbosacral junction.    Alignment: Alignment is anatomic.    Bone marrow signal/fracture: No  evidence of pathologic marrow  infiltration. No evidence of prior fracture.    Conus: The conus is within normal limits of signal intensity and  morphology. The conus terminates normally at L1.    Paraspinal soft tissues: Paraspinal soft tissues are unremarkable.    Lower thoracic spine: Visualized lower thoracic canal and foramina  are patent.    L1-L2: Canal and foramina are patent.    L2-L3: Canal and foramina are patent    L3-L4: Left central disc extrusion and left foraminal disc protrusion  is noted. There is mild left-sided neural foraminal narrowing. The  protrusion contacts the exiting left L3 nerve root.    L4-L5: Canal and foramina are patent    L5-S1: Canal and foramina are patent    Sacrum and iliac wings: The visualized sacrum and iliac wings are  within normal limits.  The presacral soft tissues are normal in  appearance.    Impression  Lumbar spondylosis most pronounced at the L3-4 level with left  central disc extrusion left foraminal disc protrusion. There is  contact of the exiting left L3 nerve root.    Signed by: Julio Cesar Fox 12/15/2023 5:55 PM  Dictation workstation:   [id number]     No image results found.       1. Lumbar radiculopathy  [facility] pain management TC    [facility] pain management TC    Transforaminal    Transforaminal           ASSESSMENT/PLAN  Rachael Meredith is a 63 y.o. female presents for bilateral L3 transforaminal epidural steroid injection     Our plan is as follows:  - Follow In pain clinic  - Continue to participate in physical therapy as well as physician directed home exercises  - Continue pain medications as prescribed       Royal Leyva MD

## 2024-01-12 NOTE — PROCEDURES
Preoperative diagnosis:  Lumbar radiculitis  Postoperative diagnosis:  Lumbar radiculitis, lumbar disc disease, stenosis  Procedure: Bilateral L3 lumbar transforaminal epidural steroid injection under fluoroscopic guidance  Surgeon: Magali Zurita  Assistant:  Fellow, Cj Cisneros  Anesthesia: Local plus IV sedation  Complications: Apparently none    Clinical note: Ms. Meredith is a white female with a history of back and leg symptoms, now bilateral who presents to have a bilateral epidural steroid injection.  She has failed other conservative measures for her pain.    Procedure note: The patient was met in the preoperative holding area after risks benefits and alternatives to procedure were discussed with the patient, informed consent was obtained. Patient brought back to the procedure room and placed in the prone position on the fluoroscopy table. Area over the back was exposed, prepped, draped, in the usual sterile fashion.  Skin and subcutaneous tissues to the neuroforamen was anesthetized using 0.5% lidocaine.  22-gauge Sprotte needles were inserted in the skin and advanced into the foramen. Needle tip position was confirmed in AP oblique and lateral view.  Contrast was injected which showed appropriate epidural spread on the right but the left needle had to be slightly medialized to get the best epidural spread.  In the final position there was good epidural spread bilaterally and no intravascular or intrathecal uptake. A total of 2 mL of 0.5% lidocaine mixed with 10 mg dexamethasone was injected in divided doses among the 2 needles. Needles removed, bandage applied, patient tolerated the procedure well with no immediate complications.

## 2024-01-22 DIAGNOSIS — M54.16 LUMBAR RADICULOPATHY: ICD-10-CM

## 2024-02-05 ENCOUNTER — HOSPITAL ENCOUNTER (OUTPATIENT)
Dept: RADIOLOGY | Facility: HOSPITAL | Age: 64
Discharge: HOME | End: 2024-02-05
Payer: MEDICARE

## 2024-02-05 VITALS
RESPIRATION RATE: 16 BRPM | WEIGHT: 110 LBS | SYSTOLIC BLOOD PRESSURE: 126 MMHG | OXYGEN SATURATION: 100 % | HEIGHT: 62 IN | BODY MASS INDEX: 20.24 KG/M2 | TEMPERATURE: 98.1 F | DIASTOLIC BLOOD PRESSURE: 74 MMHG | HEART RATE: 52 BPM

## 2024-02-05 DIAGNOSIS — M54.16 LUMBAR RADICULOPATHY: ICD-10-CM

## 2024-02-05 PROCEDURE — 64483 NJX AA&/STRD TFRM EPI L/S 1: CPT | Mod: 50 | Performed by: ANESTHESIOLOGY

## 2024-02-05 PROCEDURE — 77003 FLUOROGUIDE FOR SPINE INJECT: CPT

## 2024-02-05 PROCEDURE — 64483 NJX AA&/STRD TFRM EPI L/S 1: CPT | Performed by: ANESTHESIOLOGY

## 2024-02-05 PROCEDURE — 2500000004 HC RX 250 GENERAL PHARMACY W/ HCPCS (ALT 636 FOR OP/ED): Performed by: ANESTHESIOLOGY

## 2024-02-05 RX ORDER — MIDAZOLAM HYDROCHLORIDE 1 MG/ML
INJECTION INTRAMUSCULAR; INTRAVENOUS
Status: COMPLETED | OUTPATIENT
Start: 2024-02-05 | End: 2024-02-05

## 2024-02-05 RX ADMIN — MIDAZOLAM HYDROCHLORIDE 2 MG: 1 INJECTION INTRAMUSCULAR; INTRAVENOUS at 08:55

## 2024-02-05 ASSESSMENT — PAIN SCALES - GENERAL
PAINLEVEL_OUTOF10: 7
PAINLEVEL_OUTOF10: 0 - NO PAIN
PAINLEVEL_OUTOF10: 2
PAINLEVEL_OUTOF10: 0 - NO PAIN
PAINLEVEL_OUTOF10: 3

## 2024-02-05 ASSESSMENT — PAIN - FUNCTIONAL ASSESSMENT
PAIN_FUNCTIONAL_ASSESSMENT: 0-10

## 2024-02-05 NOTE — PROCEDURES
Preoperative diagnosis:  Lumbar radiculitis  Postoperative diagnosis:  Lumbar radiculitis, spinal stenosis, disc disease  Procedure: Bilateral L3 transforaminal epidural steroid injection under fluoroscopic guidance with methylprednisolone  Surgeon: Magali Zurita  Assistant:  Fellow, Clara Castillo  Anesthesia: Local plus IV sedation  Complications: Apparently none    Clinical note: Ms. Meredith is a 63-year-old female with history of back and leg symptoms and known lumbar disc disease and stenosis worst at the L3-4 level.  We discussed using a different steroid called methylprednisolone.  If we do not see sustained relief following this procedure then we will have a follow-up visit in the office.    Procedure note: The patient was met in the preoperative holding area after risks benefits and alternatives to procedure were discussed with the patient, informed consent was obtained. Patient brought back to the procedure room and placed in the prone position on the fluoroscopy table. Area over the back was exposed, prepped, draped, in the usual sterile fashion.  Skin and subcutaneous tissues to the neuroforamen was anesthetized using 0.5% lidocaine.  Two 90 mm 22-gauge Sprotte needles were inserted in the skin and advanced into the foramen. Needle tip position was confirmed in AP oblique and lateral views.  The left-sided needle was slightly more anteriorly once we are in the lateral view.  In the final position, contrast was injected which showed appropriate epidural spread, no intravascular or intrathecal uptake. A total of 2 mL of 0.5% lidocaine mixed with 40 mg of methylprednisolone was injected in divided doses among the 2 needles. Needles removed, bandage applied, patient tolerated the procedure well with no immediate complications.

## 2024-02-05 NOTE — H&P
"History Of Present Illness  Rachael Meredith is a 63 y.o. female presenting with bilateral low back pain.     Past Medical History  Past Medical History:   Diagnosis Date    Herpes genitalis     History of torn meniscus of right knee     Unilateral primary osteoarthritis, right knee        Surgical History  Past Surgical History:   Procedure Laterality Date    BREAST BIOPSY      Open     SECTION, CLASSIC      MOUTH SURGERY      Oral Surgery Tooth Extraction        Social History  She reports that she quit smoking about 42 years ago. Her smoking use included cigarettes. She has never used smokeless tobacco. She reports current alcohol use. She reports that she does not use drugs.    Family History  No family history on file.     Allergies  Mirtazapine    Review of Systems   13 point ROS done and negative except for the above.   Physical Exam     General: NAD, well nourished   Eyes: Non-icteric sclera, EOMI  Ears, Nose, Mouth, and Throat: External ears and nose appear to be without deformity or rash. No lesions or masses noted. Hearing is grossly intact.   Neck: Trachea midline  Respiratory: Nonlabored breathing   Cardiovascular: No JVD  Skin: No rashes or open lesions/ulcers identified on skin.    Last Recorded Vitals  Blood pressure 141/80, pulse 56, temperature 36.5 °C (97.7 °F), temperature source Temporal, resp. rate 17, height 1.575 m (5' 2\"), weight 49.9 kg (110 lb), SpO2 100 %.    Relevant Results           Assessment/Plan   Problem List Items Addressed This Visit    None  Visit Diagnoses         Codes    Lumbar radiculopathy     M54.16    Relevant Orders    [facility] pain management TC    Transforaminal            Bilateral L3 lumbar transforaminal epidural steroid injection with Depo methylprednisolone.    Risks, benefits, alternatives to the procedure were discussed in detail and patient was amenable to proceeding.    Osvaldo Womack MD   "

## 2024-03-14 ENCOUNTER — TELEPHONE (OUTPATIENT)
Dept: PRIMARY CARE | Facility: CLINIC | Age: 64
End: 2024-03-14
Payer: MEDICARE

## 2024-03-14 NOTE — TELEPHONE ENCOUNTER
Patient called and stated she tested positive for covid this morning.  She says she does not feel too bad but wants to know if she should be taking Paxlovid?    [phone]

## 2024-03-19 DIAGNOSIS — Z00.00 HEALTHCARE MAINTENANCE: ICD-10-CM

## 2024-04-05 ENCOUNTER — LAB (OUTPATIENT)
Dept: LAB | Facility: LAB | Age: 64
End: 2024-04-05
Payer: MEDICARE

## 2024-04-05 DIAGNOSIS — Z00.00 HEALTHCARE MAINTENANCE: ICD-10-CM

## 2024-04-05 LAB
25(OH)D3 SERPL-MCNC: 59 NG/ML (ref 31–100)
ALBUMIN SERPL BCP-MCNC: 4.5 G/DL (ref 3.4–5)
ALP SERPL-CCNC: 29 U/L (ref 33–136)
ALT SERPL W P-5'-P-CCNC: 22 U/L (ref 7–45)
ANION GAP SERPL CALC-SCNC: 13 MMOL/L (ref 10–20)
APPEARANCE UR: CLEAR
AST SERPL W P-5'-P-CCNC: 21 U/L (ref 9–39)
BASOPHILS # BLD AUTO: 0.01 X10*3/UL (ref 0–0.1)
BASOPHILS NFR BLD AUTO: 0.2 %
BILIRUB SERPL-MCNC: 0.4 MG/DL (ref 0–1.2)
BILIRUB UR STRIP.AUTO-MCNC: NEGATIVE MG/DL
BUN SERPL-MCNC: 16 MG/DL (ref 6–23)
CALCIUM SERPL-MCNC: 9.7 MG/DL (ref 8.6–10.3)
CHLORIDE SERPL-SCNC: 98 MMOL/L (ref 98–107)
CHOLEST SERPL-MCNC: 240 MG/DL (ref 133–200)
CHOLEST/HDLC SERPL: 2.3 {RATIO}
CO2 SERPL-SCNC: 26 MMOL/L (ref 21–32)
COLOR UR: YELLOW
CREAT SERPL-MCNC: 0.83 MG/DL (ref 0.5–1.05)
CRP SERPL HS-MCNC: 0.5 MG/L
EGFRCR SERPLBLD CKD-EPI 2021: 78 ML/MIN/1.73M*2
EOSINOPHIL # BLD AUTO: 0.11 X10*3/UL (ref 0–0.7)
EOSINOPHIL NFR BLD AUTO: 2.1 %
ERYTHROCYTE [DISTWIDTH] IN BLOOD BY AUTOMATED COUNT: 13.1 % (ref 11.5–14.5)
EST. AVERAGE GLUCOSE BLD GHB EST-MCNC: 120 MG/DL
GLUCOSE SERPL-MCNC: 106 MG/DL (ref 74–99)
GLUCOSE UR STRIP.AUTO-MCNC: NEGATIVE MG/DL
HBA1C MFR BLD: 5.8 %
HCT VFR BLD AUTO: 39.2 % (ref 36–46)
HDLC SERPL-MCNC: 105 MG/DL
HGB BLD-MCNC: 12.7 G/DL (ref 12–16)
HOLD SPECIMEN: NORMAL
IMM GRANULOCYTES # BLD AUTO: 0.02 X10*3/UL (ref 0–0.7)
IMM GRANULOCYTES NFR BLD AUTO: 0.4 % (ref 0–0.9)
KETONES UR STRIP.AUTO-MCNC: NEGATIVE MG/DL
LDLC SERPL CALC-MCNC: 121 MG/DL (ref 65–130)
LEUKOCYTE ESTERASE UR QL STRIP.AUTO: NEGATIVE
LYMPHOCYTES # BLD AUTO: 1.55 X10*3/UL (ref 1.2–4.8)
LYMPHOCYTES NFR BLD AUTO: 29.4 %
MCH RBC QN AUTO: 28.2 PG (ref 26–34)
MCHC RBC AUTO-ENTMCNC: 32.4 G/DL (ref 32–36)
MCV RBC AUTO: 87 FL (ref 80–100)
MONOCYTES # BLD AUTO: 0.52 X10*3/UL (ref 0.1–1)
MONOCYTES NFR BLD AUTO: 9.8 %
NEUTROPHILS # BLD AUTO: 3.07 X10*3/UL (ref 1.2–7.7)
NEUTROPHILS NFR BLD AUTO: 58.1 %
NITRITE UR QL STRIP.AUTO: NEGATIVE
NRBC BLD-RTO: NORMAL /100{WBCS}
PH UR STRIP.AUTO: 6.5 [PH]
PLATELET # BLD AUTO: 257 X10*3/UL (ref 150–450)
POTASSIUM SERPL-SCNC: 4.8 MMOL/L (ref 3.5–5.3)
PROT SERPL-MCNC: 6.7 G/DL (ref 6.4–8.2)
PROT UR STRIP.AUTO-MCNC: NEGATIVE MG/DL
RBC # BLD AUTO: 4.5 X10*6/UL (ref 4–5.2)
RBC # UR STRIP.AUTO: NEGATIVE /UL
SODIUM SERPL-SCNC: 132 MMOL/L (ref 136–145)
SP GR UR STRIP.AUTO: 1.01
TRIGL SERPL-MCNC: 70 MG/DL (ref 40–150)
TSH SERPL DL<=0.05 MIU/L-ACNC: 1.58 MIU/L (ref 0.27–4.2)
UROBILINOGEN UR STRIP.AUTO-MCNC: 0.2 MG/DL
WBC # BLD AUTO: 5.3 X10*3/UL (ref 4.4–11.3)

## 2024-04-05 PROCEDURE — 36415 COLL VENOUS BLD VENIPUNCTURE: CPT

## 2024-04-05 PROCEDURE — 86141 C-REACTIVE PROTEIN HS: CPT

## 2024-04-05 PROCEDURE — 80053 COMPREHEN METABOLIC PANEL: CPT

## 2024-04-05 PROCEDURE — 83036 HEMOGLOBIN GLYCOSYLATED A1C: CPT

## 2024-04-05 PROCEDURE — 80061 LIPID PANEL: CPT

## 2024-04-05 PROCEDURE — 81003 URINALYSIS AUTO W/O SCOPE: CPT

## 2024-04-05 PROCEDURE — 84443 ASSAY THYROID STIM HORMONE: CPT

## 2024-04-05 PROCEDURE — 82306 VITAMIN D 25 HYDROXY: CPT

## 2024-04-05 PROCEDURE — 85025 COMPLETE CBC W/AUTO DIFF WBC: CPT

## 2024-04-12 NOTE — PROGRESS NOTES
"Physical Exam    Name Rachael Meredith    Date of Service :4/16/2024      Rachael Meredith is a 64 y.o. year old female who is being seen for a comprehensive exam.   history of HTN, pre- glaucoma, osteoporosis ( follows with Dr. Armenta. On Prolia) , Functional bowl disorder with bloating, cramping and diarrhea, history depression and anxiety now in remission.    She is still having lower back pain and had 2 tonio without relief. Last one was in Feb.  PT has not helped despite stretches. When she wakes up she is very \"crippled\".  She has to stretch and as she starts to move it gets better. She has a gelling phenomenon. She cannot do any high intensity cardio. She can't go as fast or long as she really starts to hurt.  She is currently not interested in any more injections or surgery. She did find the prednisone taper helpful in the past however she did feel more agitated on it.   Her pain is described as Mostly in the buttocks and down her hamstrings. Pain and stiffness in the SI joints and pain down both legs but mostly the  left lower leg.  Stretching does help.    She continues to have some difficulty falling asleep. She would like a refill of her xanax. She takes a 1/2 tab of 0.25  4 nights a week.  Her last 3 months script has lasted 7 months.  Other nights she is using gabapentin and she will take a gabapentin for sleep but she gets a hang over and maybe does that once a week.  It does help with her radicular and back pain.  Falls asleep once or twice a week on her own .  Her anxiety is much improved as is her depression.  She would like to lose a few pounds because she doesn't like the way her clothes are fitting her.        Patient Active Problem List   Diagnosis    Benign essential hypertension    Borderline glaucoma    Glucose intolerance (impaired glucose tolerance)    Insomnia due to stress    Osteoporosis    Functional bowel disorder    Osteoporosis, post-menopausal    Myalgia        Past Medical History:   Diagnosis " Date    Herpes genitalis     History of torn meniscus of right knee     Unilateral primary osteoarthritis, right knee         Past Surgical History:   Procedure Laterality Date    BREAST BIOPSY      Open     SECTION, CLASSIC      MOUTH SURGERY      Oral Surgery Tooth Extraction        Social History     Tobacco Use    Smoking status: Former     Current packs/day: 0.00     Types: Cigarettes     Quit date:      Years since quittin.3    Smokeless tobacco: Never   Substance Use Topics    Alcohol use: Yes     Comment: socially    Drug use: Never      Social History     Social History Narrative    . She exercises almost everyday. no tobacco, ETOH two to three nightly    Two adult children: Her daughter, is an ATTY at [employer], Her son  is in finance in [place]       Family History   Problem Relation Name Age of Onset    Heart attack Mother  74    Diabetes Father      Stroke Father      Hypertension Father      Stroke Maternal Grandmother  34            Current Outpatient Medications:     ALPRAZolam (Xanax) 0.25 mg tablet, Take 1 tablet (0.25 mg) by mouth as needed at bedtime for anxiety., Disp: 90 tablet, Rfl: 0    buPROPion XL (Wellbutrin XL) 300 mg 24 hr tablet, Take 1 tablet (300 mg) by mouth once daily in the morning. Do not crush, chew, or split., Disp: 90 tablet, Rfl: 3    calcium carbonate-vitamin D3 600 mg-10 mcg (400 unit) tablet, Take 1 tablet by mouth once daily., Disp: , Rfl:     cholecalciferol (Vitamin D-3) 25 MCG (1000 UT) capsule, Take 2 capsules (50 mcg) by mouth once daily., Disp: , Rfl:     denosumab (Prolia) 60 mg/mL syringe, Inject 1 mL (60 mg) under the skin 1 time., Disp: , Rfl:     escitalopram (Lexapro) 5 mg tablet, Take 1 tablet (5 mg) by mouth once daily., Disp: 90 tablet, Rfl: 3    fexofenadine (Allegra) 180 mg tablet, Take 1 tablet (180 mg) by mouth once daily., Disp: 90 tablet, Rfl: 3    fluticasone (Flonase) 50 mcg/actuation nasal spray, Administer 2 sprays into  "each nostril once daily., Disp: 16 mL, Rfl: 5    gabapentin (Neurontin) 300 mg capsule, Day 1-5 300mg at bedtime, Day 6-10 600mg at bedtime; if needed, increase on Day 11 and after to 900mg at bedtime. Decrease one tablet at a time if excessively drowsy or fatigued., Disp: 90 capsule, Rfl: 2    ipratropium (Atrovent) 42 mcg (0.06 %) nasal spray, ADMINISTER 2 SPRAYS INTO EACH NOSTRIL 2 TIMES A DAY FOR 19 DAYS., Disp: 45 mL, Rfl: 3    losartan (Cozaar) 50 mg tablet, Take 1 tablet (50 mg) by mouth once daily., Disp: 90 tablet, Rfl: 3    magnesium oxide 400 mg magnesium capsule, Take by mouth once daily., Disp: , Rfl:     valACYclovir (Valtrex) 1 gram tablet, , Disp: , Rfl:     Allergies   Allergen Reactions    Mirtazapine Other and Agitation     Other Reaction(s): Other       Review of Systems     /80 (BP Location: Left arm, Patient Position: Sitting)   Pulse 57   Temp 36.6 °C (97.9 °F)   Ht 1.575 m (5' 2\")   Wt 49.9 kg (110 lb)   SpO2 99%   BMI 20.12 kg/m²  Body mass index is 20.12 kg/m².    Physical Exam  Vitals reviewed.   Constitutional:       General: She is not in acute distress.     Appearance: Normal appearance.   HENT:      Right Ear: Tympanic membrane and external ear normal.      Left Ear: Tympanic membrane and external ear normal.   Eyes:      Extraocular Movements: Extraocular movements intact.   Neck:      Vascular: No carotid bruit.   Cardiovascular:      Rate and Rhythm: Normal rate and regular rhythm.      Pulses: Normal pulses.      Heart sounds: Normal heart sounds. No murmur heard.     No gallop.   Pulmonary:      Effort: Pulmonary effort is normal. No respiratory distress.      Breath sounds: Normal breath sounds. No wheezing, rhonchi or rales.   Abdominal:      General: Bowel sounds are normal. There is no distension.      Palpations: There is no mass.      Tenderness: There is no abdominal tenderness. There is no guarding.   Musculoskeletal:         General: No swelling or tenderness. " Normal range of motion.      Right lower leg: No edema.      Left lower leg: No edema.   Lymphadenopathy:      Cervical: No cervical adenopathy.      Upper Body:      Right upper body: No supraclavicular or axillary adenopathy.      Left upper body: No supraclavicular or axillary adenopathy.      Lower Body: No right inguinal adenopathy. No left inguinal adenopathy.   Skin:     General: Skin is warm and dry.      Findings: No rash.   Neurological:      General: No focal deficit present.      Mental Status: She is alert.   Psychiatric:         Mood and Affect: Mood normal.           RESULTS/DATA:  Reviewed Standard Labs for this physical with patient ( any significant issues addressed in A/P )     ECG: normal sinus rhythm, no blocks or conduction defects, no ischemic changes.       Assessment/Plan   Discussed options for management of her back pain and disc issue-  Continued PT, re try prednisone taper, referral back to pain management, PMR referral and potential surgical options. She would like to avoid anymore injections at this point. She is interested in more PT and will refer to [facility] for chiropractor and medical massage.   Anxiety and depression in remission. Will keep her on same regimen for at least a year which will be in June and she may want to try to come off of the lexapro  Discussed I do not want her to lose any more weight. Discussed low BMI not good for bone health. Discussed other options like cool sculpting.   Current on routine screenings  Will update her Cardiac Calcium score   Mammogram due in August   GYN exam next year     Problem List Items Addressed This Visit    None  Visit Diagnoses       Left lumbar radiculopathy    -  Primary    Relevant Orders    Referral to Physical Therapy            ROUTINE:       Immunizations current. Discussed updating her Covid booster late summer early fall   Mammogram: 8/3/23 neg   PAP:    2/14/23   neg. HPV neg   7/19 negative HPV neg.  GYN  EXAM: 2/14/23   will repeat in Feb. 2025   COLONOSCOPY:  1/2019 repeat 10 years  DEXA: 8/7/23  T score   -2.6 in left femur  per Dr. Armenta on Prolia  current     OPHTHALMOLOGY:  Dr. Brunner in June   DERMATOLOGY - Dr. Brantley ( tomorrow )   DENTISTRY: current has appointment next week        Sharon Welch MD

## 2024-04-16 ENCOUNTER — PATIENT MESSAGE (OUTPATIENT)
Dept: PRIMARY CARE | Facility: CLINIC | Age: 64
End: 2024-04-16

## 2024-04-16 ENCOUNTER — OFFICE VISIT (OUTPATIENT)
Dept: PRIMARY CARE | Facility: CLINIC | Age: 64
End: 2024-04-16
Payer: MEDICARE

## 2024-04-16 VITALS
WEIGHT: 110 LBS | TEMPERATURE: 97.9 F | BODY MASS INDEX: 20.24 KG/M2 | DIASTOLIC BLOOD PRESSURE: 80 MMHG | HEART RATE: 57 BPM | SYSTOLIC BLOOD PRESSURE: 129 MMHG | HEIGHT: 62 IN | OXYGEN SATURATION: 99 %

## 2024-04-16 DIAGNOSIS — F51.02 INSOMNIA DUE TO STRESS: ICD-10-CM

## 2024-04-16 DIAGNOSIS — Z12.31 ENCOUNTER FOR SCREENING MAMMOGRAM FOR MALIGNANT NEOPLASM OF BREAST: ICD-10-CM

## 2024-04-16 DIAGNOSIS — Z00.00 ANNUAL PHYSICAL EXAM: ICD-10-CM

## 2024-04-16 DIAGNOSIS — E78.5 HYPERLIPIDEMIA, UNSPECIFIED HYPERLIPIDEMIA TYPE: ICD-10-CM

## 2024-04-16 DIAGNOSIS — R74.8 LOW SERUM ALKALINE PHOSPHATASE: Primary | ICD-10-CM

## 2024-04-16 DIAGNOSIS — F41.9 ANXIETY: ICD-10-CM

## 2024-04-16 DIAGNOSIS — M54.16 LEFT LUMBAR RADICULOPATHY: Primary | ICD-10-CM

## 2024-04-16 PROCEDURE — 3074F SYST BP LT 130 MM HG: CPT | Performed by: INTERNAL MEDICINE

## 2024-04-16 PROCEDURE — 3079F DIAST BP 80-89 MM HG: CPT | Performed by: INTERNAL MEDICINE

## 2024-04-16 PROCEDURE — 1160F RVW MEDS BY RX/DR IN RCRD: CPT | Performed by: INTERNAL MEDICINE

## 2024-04-16 PROCEDURE — UHSPHYS PR UH SELECT PHYSICAL: Performed by: INTERNAL MEDICINE

## 2024-04-16 PROCEDURE — 93000 ELECTROCARDIOGRAM COMPLETE: CPT | Performed by: INTERNAL MEDICINE

## 2024-04-16 PROCEDURE — 1159F MED LIST DOCD IN RCRD: CPT | Performed by: INTERNAL MEDICINE

## 2024-04-16 PROCEDURE — 1036F TOBACCO NON-USER: CPT | Performed by: INTERNAL MEDICINE

## 2024-04-16 RX ORDER — PREDNISONE 10 MG/1
TABLET ORAL
Qty: 21 TABLET | Refills: 0 | Status: SHIPPED | OUTPATIENT
Start: 2024-04-16

## 2024-04-16 RX ORDER — ALPRAZOLAM 0.25 MG/1
0.25 TABLET ORAL NIGHTLY PRN
Qty: 90 TABLET | Refills: 0 | Status: SHIPPED | OUTPATIENT
Start: 2024-04-16 | End: 2024-07-15

## 2024-04-16 NOTE — PATIENT INSTRUCTIONS
[facility] - referral placed.  I will contact them   Prednisone taper   Script for medical massage and chiropractic care  ( [facility] )  Cool sculpting -  ink on ink off ( [employer]) on [address] ( or any place )     Continue core exercises   Mammogram due after 8/3/24   Cardiac Calcium score ordered   Xanax refilled for sleep  Follow up for routine check at 6 months

## 2024-04-16 NOTE — PROGRESS NOTES
On review of her labs, she has has a chronic low alkaline phosphatase. Will check further labs for secondary causes.

## 2024-04-19 ENCOUNTER — LAB (OUTPATIENT)
Dept: LAB | Facility: LAB | Age: 64
End: 2024-04-19
Payer: MEDICARE

## 2024-04-19 DIAGNOSIS — R74.8 LOW SERUM ALKALINE PHOSPHATASE: ICD-10-CM

## 2024-04-19 LAB
MAGNESIUM SERPL-MCNC: 2.2 MG/DL (ref 1.6–2.4)
PHOSPHATE SERPL-MCNC: 3.8 MG/DL (ref 2.5–4.9)
VIT B12 SERPL-MCNC: 1182 PG/ML (ref 211–946)

## 2024-04-19 PROCEDURE — 36415 COLL VENOUS BLD VENIPUNCTURE: CPT

## 2024-04-19 PROCEDURE — 84100 ASSAY OF PHOSPHORUS: CPT

## 2024-04-19 PROCEDURE — 84630 ASSAY OF ZINC: CPT

## 2024-04-19 PROCEDURE — 83735 ASSAY OF MAGNESIUM: CPT

## 2024-04-19 PROCEDURE — 82525 ASSAY OF COPPER: CPT

## 2024-04-19 PROCEDURE — 82607 VITAMIN B-12: CPT

## 2024-04-22 LAB
COPPER SERPL-MCNC: 119.2 UG/DL (ref 80–155)
ZINC SERPL-MCNC: 69.1 UG/DL (ref 60–120)

## 2024-04-25 ENCOUNTER — APPOINTMENT (OUTPATIENT)
Dept: INTEGRATIVE MEDICINE | Facility: CLINIC | Age: 64
End: 2024-04-25
Payer: MEDICARE

## 2024-05-01 ENCOUNTER — TELEPHONE (OUTPATIENT)
Dept: PRIMARY CARE | Facility: CLINIC | Age: 64
End: 2024-05-01
Payer: MEDICARE

## 2024-05-01 NOTE — TELEPHONE ENCOUNTER
Patient would like to update you on her back problem and she now has a new problem to discuss    Please call  [phone]    Per patient can wait until tomorrow.

## 2024-05-02 NOTE — TELEPHONE ENCOUNTER
Not making much progress with her back pain in PT at [facility]. She has had 2 transforaminal epidural injections without relief. Last one was in feb. 2024.   She was advised by her PT not to do osteopathic manipulation.  She is continuing to get Massages.  Discussed that she could return for other pain management procedures. She may consider consulting a surgeon.  She has not been using the gabapentin 300 mg regularly. Advised that she start using it regularly at night. She Also wanted me to know she has some exposed bone along her Jaw.  She consulted Dr. Amin a periodontist and treated with a medicated mouth rinse.  She was worried it maybe Prolia and she did inform Dr. Armenta who is aware.

## 2024-05-22 ENCOUNTER — TELEPHONE (OUTPATIENT)
Dept: PRIMARY CARE | Facility: CLINIC | Age: 64
End: 2024-05-22
Payer: MEDICARE

## 2024-05-22 DIAGNOSIS — R29.898 EXPOSED MANDIBULAR BONE: Primary | ICD-10-CM

## 2024-05-22 NOTE — TELEPHONE ENCOUNTER
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

## 2024-05-28 ENCOUNTER — APPOINTMENT (OUTPATIENT)
Dept: INTEGRATIVE MEDICINE | Facility: CLINIC | Age: 64
End: 2024-05-28
Payer: MEDICARE

## 2024-05-28 NOTE — PROGRESS NOTES
History of Present Illness    Rachael Meredith is a 64 y.o. female who is seen at the request of Dr. Sharon Welch.  He has had some mouth discomfort for the past 6 weeks or so.  She was found on examination to have some exposed bone.  Of note is that she is on the Prolia for osteoporosis.  There is no imaging done.      Past Medical History    The past medical history is mainly relevant for osteoporosis and high blood pressure.  Her medications are documented in the chart.  She does not have any known allergies to medicine.  She never smoked.  She drinks minimally.  She is a retired .  She is here today with a friend.    Physical Exam    The patient is alert and oriented. Examination of the external ears, ear canals, and eardrums, is within normal limits. Examination of the anterior and external nose is negative. Examination of the oral cavity and oropharynx is is negative except for a small area of right lower mandibular bone exposed.  The adjacent tissue looks fine.  There is a very small ulceration in the hard palate area on the right.  There is nothing suspicious.  There is good mobility of the tongue and palate. There is good mandibular excursion. Palpation of the parotid, neck, and thyroid field fails to show any worrisome masses or adenopathies.    Assessment and Plan    Small area of mandibular exposure most likely secondary to Prolia.  The patient is to see her rheumatologist tomorrow.  I also sent her to one of my dental colleagues in [facility] for an opinion regarding the management.  I am definitely not worried about cancer.    Depending on the opinions she may need to be seen in follow-up.

## 2024-05-29 ENCOUNTER — OFFICE VISIT (OUTPATIENT)
Dept: OTOLARYNGOLOGY | Facility: CLINIC | Age: 64
End: 2024-05-29
Payer: MEDICARE

## 2024-05-29 VITALS — WEIGHT: 112 LBS | TEMPERATURE: 96.8 F | HEIGHT: 62 IN | BODY MASS INDEX: 20.61 KG/M2

## 2024-05-29 DIAGNOSIS — R29.898 EXPOSED MANDIBULAR BONE: ICD-10-CM

## 2024-05-29 PROCEDURE — 1159F MED LIST DOCD IN RCRD: CPT | Performed by: OTOLARYNGOLOGY

## 2024-05-29 PROCEDURE — 1036F TOBACCO NON-USER: CPT | Performed by: OTOLARYNGOLOGY

## 2024-05-29 PROCEDURE — 1160F RVW MEDS BY RX/DR IN RCRD: CPT | Performed by: OTOLARYNGOLOGY

## 2024-05-29 PROCEDURE — 99203 OFFICE O/P NEW LOW 30 MIN: CPT | Performed by: OTOLARYNGOLOGY

## 2024-05-29 RX ORDER — CHLORHEXIDINE GLUCONATE ORAL RINSE 1.2 MG/ML
SOLUTION DENTAL
COMMUNITY

## 2024-05-29 ASSESSMENT — PATIENT HEALTH QUESTIONNAIRE - PHQ9
2. FEELING DOWN, DEPRESSED OR HOPELESS: NOT AT ALL
SUM OF ALL RESPONSES TO PHQ9 QUESTIONS 1 AND 2: 0
1. LITTLE INTEREST OR PLEASURE IN DOING THINGS: NOT AT ALL

## 2024-05-30 ENCOUNTER — TELEPHONE (OUTPATIENT)
Dept: PRIMARY CARE | Facility: CLINIC | Age: 64
End: 2024-05-30
Payer: MEDICARE

## 2024-05-30 NOTE — TELEPHONE ENCOUNTER
Patient would like to give you an update on her mouth and what's been going on.    Please call [phone]    NOT URGENT per patient

## 2024-05-30 NOTE — TELEPHONE ENCOUNTER
She will consult another facial specialist/dentist to try to sort out the cause of her exposed mandible and a periodontist. Still sorting out cause and management.

## 2024-06-10 ENCOUNTER — HOSPITAL ENCOUNTER (OUTPATIENT)
Dept: RADIOLOGY | Facility: HOSPITAL | Age: 64
Discharge: HOME | End: 2024-06-10
Payer: MEDICARE

## 2024-06-10 DIAGNOSIS — E78.5 HYPERLIPIDEMIA, UNSPECIFIED HYPERLIPIDEMIA TYPE: ICD-10-CM

## 2024-06-10 PROCEDURE — 75571 CT HRT W/O DYE W/CA TEST: CPT

## 2024-06-19 DIAGNOSIS — F32.A DEPRESSION, UNSPECIFIED DEPRESSION TYPE: ICD-10-CM

## 2024-06-19 RX ORDER — BUPROPION HYDROCHLORIDE 300 MG/1
300 TABLET ORAL EVERY MORNING
Qty: 90 TABLET | Refills: 3 | Status: SHIPPED | OUTPATIENT
Start: 2024-06-19 | End: 2025-06-19

## 2024-07-09 ENCOUNTER — TELEPHONE (OUTPATIENT)
Dept: PRIMARY CARE | Facility: CLINIC | Age: 64
End: 2024-07-09
Payer: MEDICARE

## 2024-07-09 NOTE — TELEPHONE ENCOUNTER
Called Rachael. She is going out of town tomorrow and will call back or MY CHART me any updates as needed

## 2024-07-09 NOTE — TELEPHONE ENCOUNTER
Patient would like to keep you updated.    She was seen by oral surgeon whom prescribed antibiotics.    Please call to discuss   [phone]

## 2024-07-19 ENCOUNTER — TELEPHONE (OUTPATIENT)
Dept: PRIMARY CARE | Facility: CLINIC | Age: 64
End: 2024-07-19
Payer: MEDICARE

## 2024-07-19 NOTE — TELEPHONE ENCOUNTER
Itching on one arm. No rash.  Reassurance. Advised okay to continue the amoxicillin. If rash progresses to let me know

## 2024-07-19 NOTE — TELEPHONE ENCOUNTER
Patient has been taking Amoxilcillin 2000 mg x 1 week , then 1000 mg now.  She has developed a rash on arms.      Please call to discuss  [phone]

## 2024-08-23 ENCOUNTER — APPOINTMENT (OUTPATIENT)
Dept: RADIOLOGY | Facility: CLINIC | Age: 64
End: 2024-08-23
Payer: MEDICARE

## 2024-09-04 ENCOUNTER — HOSPITAL ENCOUNTER (OUTPATIENT)
Dept: RADIOLOGY | Facility: CLINIC | Age: 64
Discharge: HOME | End: 2024-09-04
Payer: MEDICARE

## 2024-09-04 DIAGNOSIS — Z12.31 ENCOUNTER FOR SCREENING MAMMOGRAM FOR MALIGNANT NEOPLASM OF BREAST: ICD-10-CM

## 2024-09-04 PROCEDURE — 77063 BREAST TOMOSYNTHESIS BI: CPT | Performed by: RADIOLOGY

## 2024-09-04 PROCEDURE — 77067 SCR MAMMO BI INCL CAD: CPT

## 2024-09-04 PROCEDURE — 77067 SCR MAMMO BI INCL CAD: CPT | Performed by: RADIOLOGY

## 2024-09-10 PROBLEM — M17.11 OSTEOARTHRITIS OF RIGHT KNEE: Status: ACTIVE | Noted: 2024-09-10

## 2024-09-10 RX ORDER — UREA 40 %
CREAM (GRAM) TOPICAL
COMMUNITY
Start: 2024-04-18

## 2024-09-10 RX ORDER — PENTOXIFYLLINE 400 MG/1
1 TABLET, EXTENDED RELEASE ORAL
COMMUNITY
Start: 2024-07-05

## 2024-09-10 RX ORDER — MUPIROCIN 20 MG/G
OINTMENT TOPICAL
COMMUNITY
Start: 2024-08-14

## 2024-09-10 RX ORDER — CLOBETASOL PROPIONATE 0.5 MG/ML
SOLUTION TOPICAL
COMMUNITY
Start: 2024-08-14

## 2024-09-11 ENCOUNTER — OFFICE VISIT (OUTPATIENT)
Dept: PRIMARY CARE | Facility: CLINIC | Age: 64
End: 2024-09-11
Payer: MEDICARE

## 2024-09-11 VITALS
HEART RATE: 66 BPM | OXYGEN SATURATION: 99 % | SYSTOLIC BLOOD PRESSURE: 124 MMHG | DIASTOLIC BLOOD PRESSURE: 79 MMHG | TEMPERATURE: 98.2 F

## 2024-09-11 DIAGNOSIS — M81.0 OSTEOPOROSIS, POST-MENOPAUSAL: ICD-10-CM

## 2024-09-11 DIAGNOSIS — R05.9 COUGH, UNSPECIFIED TYPE: ICD-10-CM

## 2024-09-11 DIAGNOSIS — R74.8 LOW SERUM ALKALINE PHOSPHATASE: ICD-10-CM

## 2024-09-11 DIAGNOSIS — Q78.9 SKELETAL DYSPLASIA (HHS-HCC): ICD-10-CM

## 2024-09-11 DIAGNOSIS — E78.5 HYPERLIPIDEMIA, UNSPECIFIED HYPERLIPIDEMIA TYPE: ICD-10-CM

## 2024-09-11 DIAGNOSIS — I20.89 ANGINA OF EFFORT (CMS-HCC): Primary | ICD-10-CM

## 2024-09-11 DIAGNOSIS — M54.17 LUMBOSACRAL RADICULOPATHY: ICD-10-CM

## 2024-09-11 PROCEDURE — 93000 ELECTROCARDIOGRAM COMPLETE: CPT | Performed by: INTERNAL MEDICINE

## 2024-09-11 PROCEDURE — 1159F MED LIST DOCD IN RCRD: CPT | Performed by: INTERNAL MEDICINE

## 2024-09-11 PROCEDURE — 99215 OFFICE O/P EST HI 40 MIN: CPT | Performed by: INTERNAL MEDICINE

## 2024-09-11 PROCEDURE — 3074F SYST BP LT 130 MM HG: CPT | Performed by: INTERNAL MEDICINE

## 2024-09-11 PROCEDURE — 3078F DIAST BP <80 MM HG: CPT | Performed by: INTERNAL MEDICINE

## 2024-09-11 RX ORDER — VITAMIN E MIXED 400 UNIT
CAPSULE ORAL DAILY
COMMUNITY

## 2024-09-11 NOTE — PROGRESS NOTES
Subjective   Patient ID: Rachael Meredith is a 64 y.o. female who presents for Follow-up.    HPI     Presents to discuss her sense of getting nauseous and clammy when she exerts herself. Her symptoms resolve after eating something but she does eat prior to exercise and reports that she eats often.  She also states vigorous exercise makes her lightheaded.   She also has had this issue just walking around the grocery store.  She does have hypercholesteremia with both elevated HDL and LDL. CCS of O in June of 2024.  Family history positive for premature CAD      She has  been having ongoing back pain and  a known disk extrusion with contact of L3 nerve root. She had bilateral L3 epidural steroid injection in Jan and Feb and has been doing PT.   Traction has helped the most.  Strengthening the core also helps but she cannot run and cannot exercise the way she would like to.   She has continued to have back pain and would like to go back to PT for traction.   She reports Constant pain. Can go across her lower back and into her hip and gluteus.     Ongoing bone disorder of her jaw which is being managed with periodontist and in concert discussing with her rheumatologist Dr. Armenta about osteoporosis treatment in the future.  Dr. Armenta has ordered some lab work including an alk phos.  On chart review she has had consistently low alk phos which does raise the suspicion of hypophosphatemia given her dental difficulties as well.  Will refer on to genetics and will also discussed with Dr. Armenta as if she does have this disorder would not want to treat with bisphosphonate as my understanding is that this will make her hypophosphatemia worse.  H    Cough with lying down.  Worse at night.  Has been off any acid reflux medications.   Review of Systems    Objective   /79 (BP Location: Left arm, Patient Position: Sitting)   Pulse 66   Temp 36.8 °C (98.2 °F)   SpO2 99%     Physical Exam  Constitutional:       Appearance:  Normal appearance.   Cardiovascular:      Rate and Rhythm: Normal rate and regular rhythm.      Heart sounds: Normal heart sounds.   Pulmonary:      Effort: Pulmonary effort is normal.      Breath sounds: Normal breath sounds.   Musculoskeletal:         General: Normal range of motion.   Neurological:      General: No focal deficit present.         Assessment/Plan     1. Angina of effort ([facility])  Advised her to go to the ER if her symptoms worsen   It is reassuring that she had a cardiac calcium score of 0 however she does have some hypercholesteremia and a family history.  EKG today was normal will obtain stress echocardiogram.  Did encourage her to have regular meals and a sugar source at all times as it is potential that she is having a hypoglycemic attack as well  - ECG 12 lead (Clinic Performed)  - Echocardiogram Stress Test; Future    2. Lumbosacral radiculopathy  Chronic low back pain now for over a year that is interfering with her lifestyle and ability to exercise how she would like to.  Status post 2 epidural steroid injections and physical therapy.  Her pain is consistently in the area of the L3 dermatome and I discussed it may be worthwhile for her to have a conversation with the surgeon to see if there is a minimally invasive procedure that can decompress the L3 nerve root  - Referral to Neurosurgery; Future    3. Osteoporosis, post-menopausal  On chart review she has had a consistently low alk phos and now with her history of having teeth and dental issues I wonder if she has a genetic disorder like a LPL gene or other skeletal dysplasia.  Will refer to genetics to look into potential hypophosphatasia which would have implications and how her osteoporosis is treated    The ALPL gene is responsible for producing tissue-nonspecific alkaline phosphatase (TNSALP), an enzyme that is important for bone and teeth development and growth  hypophosphatasia, given chronically low alk phos before treating  osteoporosis. IF she does have hypophosphatasia, then would not treat with an anti-resorptive as this could worsen hypophosphatsia.      4. Cough, unspecified type  Worse with lying down and at night.  She has not been on acid reflux medication in a while.  Encouraged her to restart Prilosec 40 mg daily.  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  For evaluation of potential ALPL gene abnormality  - Referral to Genetics; Future    6. Low serum alkaline phosphatase  As above  - Referral to Genetics; Future       TVT of 45 minutes with greater than 50% spent FTF in assessment/discussion and care coordination

## 2024-09-11 NOTE — PATIENT INSTRUCTIONS
I am ordering a stress test for you to further look into you getting lightheaded nauseous and clammy with exertion.  If anything should worsen you should call 911 and go to the emergency room immediately    I would like you to meet with Dr. Jeb Evans in neurosurgery to discuss potentially a surgical procedure to help you with your chronic back pain which I suspect is due to a disc touching the L3 nerve root    For management of your osteoporosis I would strongly encourage you to do a trial of anabolic or hormonal treatment such as Evenity.  Will check for ALPL gene abnormality which can have implications for your treatment   Because of your persistently low alk phos I am referring you to genetics for potential hypophosphatasia

## 2024-09-13 ENCOUNTER — TELEPHONE (OUTPATIENT)
Dept: PRIMARY CARE | Facility: CLINIC | Age: 64
End: 2024-09-13
Payer: MEDICARE

## 2024-09-13 DIAGNOSIS — R73.01 FASTING HYPERGLYCEMIA: ICD-10-CM

## 2024-09-13 DIAGNOSIS — E87.1 HYPONATREMIA: Primary | ICD-10-CM

## 2024-09-13 NOTE — TELEPHONE ENCOUNTER
She thinks she drinks only about 50 ounces of water a day and 1  1/2 cups of coffee On average daily.  She does report eating a log of protein.     Eggs, yogurt , cottage, cheese chicken.  She is on lexapro and wellbutrin and her baseline sodium has been 133 but never below 130.  She is not taking any diuretics.  She feels okay overall.    Of note her glucose was elevated as well and it was fasting. Her father developed diabetes very late in life. She will cut back on sweats and simple carbohydrates and I will also add on a hemoglobin A1C

## 2024-09-16 ENCOUNTER — LAB (OUTPATIENT)
Dept: LAB | Facility: LAB | Age: 64
End: 2024-09-16
Payer: MEDICARE

## 2024-09-16 DIAGNOSIS — E87.1 HYPONATREMIA: ICD-10-CM

## 2024-09-16 DIAGNOSIS — R73.01 FASTING HYPERGLYCEMIA: ICD-10-CM

## 2024-09-16 LAB
ALBUMIN SERPL BCP-MCNC: 4.7 G/DL (ref 3.4–5)
ANION GAP SERPL CALC-SCNC: 14 MMOL/L (ref 10–20)
BUN SERPL-MCNC: 19 MG/DL (ref 6–23)
CALCIUM SERPL-MCNC: 10 MG/DL (ref 8.6–10.3)
CHLORIDE SERPL-SCNC: 98 MMOL/L (ref 98–107)
CO2 SERPL-SCNC: 24 MMOL/L (ref 21–32)
CREAT SERPL-MCNC: 0.9 MG/DL (ref 0.5–1.05)
CREAT UR-MCNC: 67.4 MG/DL
EGFRCR SERPLBLD CKD-EPI 2021: 71 ML/MIN/1.73M*2
EST. AVERAGE GLUCOSE BLD GHB EST-MCNC: 128 MG/DL
GLUCOSE SERPL-MCNC: 75 MG/DL (ref 74–99)
HBA1C MFR BLD: 6.1 %
OSMOLALITY SERPL: 279 MOSM/KG (ref 280–300)
OSMOLALITY UR: 476 MOSM/KG (ref 200–1200)
PHOSPHATE SERPL-MCNC: 4.6 MG/DL (ref 2.5–4.9)
POTASSIUM SERPL-SCNC: 4.6 MMOL/L (ref 3.5–5.3)
SODIUM SERPL-SCNC: 131 MMOL/L (ref 136–145)
SODIUM UR-SCNC: 44 MMOL/L
SODIUM/CREAT UR-RTO: 65 MMOL/G CREAT

## 2024-09-16 PROCEDURE — 36415 COLL VENOUS BLD VENIPUNCTURE: CPT

## 2024-09-17 ENCOUNTER — TELEPHONE (OUTPATIENT)
Dept: PRIMARY CARE | Facility: CLINIC | Age: 64
End: 2024-09-17
Payer: MEDICARE

## 2024-09-17 DIAGNOSIS — F41.9 ANXIETY: ICD-10-CM

## 2024-09-17 DIAGNOSIS — F51.02 INSOMNIA DUE TO STRESS: ICD-10-CM

## 2024-09-17 RX ORDER — ESCITALOPRAM OXALATE 5 MG/1
5 TABLET ORAL DAILY
Qty: 90 TABLET | Refills: 3 | Status: SHIPPED | OUTPATIENT
Start: 2024-09-17 | End: 2025-09-17

## 2024-09-17 RX ORDER — ALPRAZOLAM 0.25 MG/1
0.25 TABLET ORAL NIGHTLY PRN
Qty: 90 TABLET | Refills: 0 | Status: SHIPPED | OUTPATIENT
Start: 2024-09-17 | End: 2024-12-16

## 2024-09-17 NOTE — TELEPHONE ENCOUNTER
Patient would like to discuss lab results with you.    Please call  [phone]    Also needs refills

## 2024-09-18 ENCOUNTER — TELEPHONE (OUTPATIENT)
Dept: GENETICS | Facility: CLINIC | Age: 64
End: 2024-09-18
Payer: MEDICARE

## 2024-09-19 ENCOUNTER — TELEPHONE (OUTPATIENT)
Dept: PRIMARY CARE | Facility: CLINIC | Age: 64
End: 2024-09-19
Payer: MEDICARE

## 2024-09-19 ENCOUNTER — HOSPITAL ENCOUNTER (OUTPATIENT)
Dept: CARDIOLOGY | Facility: HOSPITAL | Age: 64
Discharge: HOME | End: 2024-09-19
Payer: MEDICARE

## 2024-09-19 DIAGNOSIS — I20.89 ANGINA OF EFFORT (CMS-HCC): ICD-10-CM

## 2024-09-19 DIAGNOSIS — E87.1 HYPONATREMIA: Primary | ICD-10-CM

## 2024-09-19 DIAGNOSIS — R07.9 CHEST PAIN, UNSPECIFIED: ICD-10-CM

## 2024-09-19 PROCEDURE — 93016 CV STRESS TEST SUPVJ ONLY: CPT

## 2024-09-19 PROCEDURE — 93018 CV STRESS TEST I&R ONLY: CPT

## 2024-09-19 PROCEDURE — 93350 STRESS TTE ONLY: CPT

## 2024-09-19 NOTE — TELEPHONE ENCOUNTER
Discussed her hemoglobin A1C and prediabetes.  Discussed lifestyle measures   Also discussed her low sodium and urine tests.  Feel her low sodium is most likely medication related and not of clinical concern. She is aware of not drinking too much water and to be sure to eat plenty of protein. Will recheck her renal function in 4 weeks.

## 2024-10-16 ENCOUNTER — APPOINTMENT (OUTPATIENT)
Dept: PRIMARY CARE | Facility: CLINIC | Age: 64
End: 2024-10-16
Payer: MEDICARE

## 2024-11-04 ENCOUNTER — TELEPHONE (OUTPATIENT)
Dept: PRIMARY CARE | Facility: CLINIC | Age: 64
End: 2024-11-04
Payer: MEDICARE

## 2024-11-04 DIAGNOSIS — M54.17 LUMBOSACRAL RADICULOPATHY: Primary | ICD-10-CM

## 2024-11-04 NOTE — TELEPHONE ENCOUNTER
Patient would like to discuss back problem and osteoprosis.    Please call when you can.  [phone]

## 2024-11-04 NOTE — TELEPHONE ENCOUNTER
Spoke with Rachael. Her leg pain has been increasing and affecting her sleep. Her injections did not help in Feb. She does have an upcoming appointment with Dr. Evans and will also refer on to Dr. Shafer in PMR

## 2024-11-05 ENCOUNTER — APPOINTMENT (OUTPATIENT)
Dept: GENETICS | Facility: CLINIC | Age: 64
End: 2024-11-05
Payer: MEDICARE

## 2024-11-05 VITALS
HEART RATE: 57 BPM | WEIGHT: 111.99 LBS | DIASTOLIC BLOOD PRESSURE: 78 MMHG | BODY MASS INDEX: 20.61 KG/M2 | SYSTOLIC BLOOD PRESSURE: 135 MMHG | HEIGHT: 62 IN

## 2024-11-05 DIAGNOSIS — Z82.49 FAMILY HX OF HYPERTENSION: ICD-10-CM

## 2024-11-05 DIAGNOSIS — Z81.8 FAMILY HISTORY OF ATTENTION DEFICIT HYPERACTIVITY DISORDER (ADHD): ICD-10-CM

## 2024-11-05 DIAGNOSIS — M81.0 OSTEOPOROSIS WITHOUT CURRENT PATHOLOGICAL FRACTURE, UNSPECIFIED OSTEOPOROSIS TYPE: Primary | ICD-10-CM

## 2024-11-05 DIAGNOSIS — Z82.62 FAM HX-OSTEOPOROSIS: ICD-10-CM

## 2024-11-05 DIAGNOSIS — R74.8 LOW SERUM ALKALINE PHOSPHATASE: ICD-10-CM

## 2024-11-05 DIAGNOSIS — Z13.79 ASHKENAZI JEWISH ANCESTRY REQUIRING POPULATION-SPECIFIC GENETIC SCREENING: ICD-10-CM

## 2024-11-05 DIAGNOSIS — Z82.49 FAMILY HISTORY OF MI (MYOCARDIAL INFARCTION): ICD-10-CM

## 2024-11-05 DIAGNOSIS — Z80.9 FAMILY HX-MALIGNANCY: ICD-10-CM

## 2024-11-05 DIAGNOSIS — M27.0 TORUS MANDIBULARIS: ICD-10-CM

## 2024-11-05 DIAGNOSIS — Z82.3 FAMILY HX-STROKE: ICD-10-CM

## 2024-11-05 DIAGNOSIS — Z82.0 FAMILY HISTORY OF PARKINSON DISEASE: ICD-10-CM

## 2024-11-05 PROCEDURE — 1036F TOBACCO NON-USER: CPT | Performed by: STUDENT IN AN ORGANIZED HEALTH CARE EDUCATION/TRAINING PROGRAM

## 2024-11-05 PROCEDURE — 1159F MED LIST DOCD IN RCRD: CPT | Performed by: STUDENT IN AN ORGANIZED HEALTH CARE EDUCATION/TRAINING PROGRAM

## 2024-11-05 PROCEDURE — 3008F BODY MASS INDEX DOCD: CPT | Performed by: STUDENT IN AN ORGANIZED HEALTH CARE EDUCATION/TRAINING PROGRAM

## 2024-11-05 PROCEDURE — 3078F DIAST BP <80 MM HG: CPT | Performed by: STUDENT IN AN ORGANIZED HEALTH CARE EDUCATION/TRAINING PROGRAM

## 2024-11-05 PROCEDURE — 1160F RVW MEDS BY RX/DR IN RCRD: CPT | Performed by: STUDENT IN AN ORGANIZED HEALTH CARE EDUCATION/TRAINING PROGRAM

## 2024-11-05 PROCEDURE — G2212 PROLONG OUTPT/OFFICE VIS: HCPCS | Performed by: STUDENT IN AN ORGANIZED HEALTH CARE EDUCATION/TRAINING PROGRAM

## 2024-11-05 PROCEDURE — 99205 OFFICE O/P NEW HI 60 MIN: CPT | Performed by: STUDENT IN AN ORGANIZED HEALTH CARE EDUCATION/TRAINING PROGRAM

## 2024-11-05 PROCEDURE — 3075F SYST BP GE 130 - 139MM HG: CPT | Performed by: STUDENT IN AN ORGANIZED HEALTH CARE EDUCATION/TRAINING PROGRAM

## 2024-11-05 RX ORDER — RALOXIFENE HYDROCHLORIDE 60 MG/1
60 TABLET, FILM COATED ORAL DAILY
COMMUNITY

## 2024-11-05 ASSESSMENT — ENCOUNTER SYMPTOMS
DEPRESSION: 0
OCCASIONAL FEELINGS OF UNSTEADINESS: 0
LOSS OF SENSATION IN FEET: 0

## 2024-11-05 NOTE — LETTER
11/05/24    Sharon Welch MD  [address]  [facility] [address]  [place] [address]      Dear Dr. Sharon Welch MD,    I am writing to confirm that your patient, Rachael Meredith  received care in my office on 11/05/24. I have enclosed a summary of the care provided to Rachael for your reference.    Please contact me with any questions you may have regarding the visit.    Sincerely,         Jamila Lorenzana MD  [address]  [facility] [address]  [facility] [address]  [phone]    CC: No Recipients

## 2024-11-05 NOTE — PROGRESS NOTES
Genetics Department  [address] [place] [id number]  P: [phone]  F: [phone]      Subjective:   Reason for Visit:   Rachael is a 64 y.o. female who presents to Genetics clinic for an evaluation to rule out a genetic etiology for her history of low alkaline phosphatase and a dental issue. Rachael is being seen in consultation at the request of Dr. Sharon Welch. The information for this visit was obtained from the patient and the medical records.    History of Present Illness:    Last May, she had a jaw issue where she states her bone was exposed.  It healed.   She has been diagnosed with mandibular torus benign bony growths.  She states that she was told that the exposed bone was potentially secondary to trauma to the mandibular torus.     She was diagnosed with osteopenia in her late 40's.  She states that she had menopause by 48 years old.     No roots intact when lost teeth.  No history of early tooth loss.     No history of seizures or bone pain.    She states that she had lower back pain for the past year.  She states that she had weight loss but had reasons for it. She has since regained her weight. No fevers.  She states that she is going to see PM&R for this.    She has had fractures in the past related to running and one elbow fracture due to a fall.   She used to run for about 8 miles regularly.     She has a history of scoliosis as a child for which a brace was recommended but declined by her parents.     She has not had many dental caries.  She has not been extremely flexible.     She states that she has had long term treatment with bisphosphonate in the past.    DEXA 11/13/23 at 62yo showed osteoporosis.    Alkaline phosphatase on 9/12/24 was 42 (nl ).  Alk phos isoenzymes on 9/12/24 - alk phos bone % 31.9 (nl 10.7-68.3%).  Alk phos 4/5/24 was 29 (nl ).  Alk phos 11/28/23 was 32 (nl ).  PTH 38 (nl 15-65) on 9/12/24.  Calcium 10 (8.6-10.3) on  24.        Past Medical History:  Rachael  has a past medical history of Herpes genitalis, History of torn meniscus of right knee, and Unilateral primary osteoarthritis, right knee.    Past Surgical History:  Rachael  has a past surgical history that includes  section, classic; Mouth surgery; and Breast biopsy.    Pregnancy History: 2 pregnancies     Social History:  Lives by herself.    Dietary History:  She tries to eat healthy.     Family History:  A multigenerational family history was obtained as part of the visit and pertinent positives and negatives are reviewed here.  The complete pedigree will be scanned into the patient EHR.   Her father  from complications related to a surgery at 83yo and had a history of a mass in his colon that was ?cancerous and had a history of a stroke and HTN.  Her mother  at 73yo and had a history of an MI, osteoporosis dx in her 70's and possibly had a spinal fracture after a fall in her 70's.  Her brother is 69yo and A+W.  Her daughter is almost 33yo and A+W.  Her son is almost 30yo and has a history of HTN and ADHD.  On the maternal side of the family, her grandfather  in his 60's from an MI.  Her MGM  at 83yo from a stroke.  Her maternal great uncle who is  had Parkinson's.    On her paternal side of the family, her uncle  in his 50's from ?cancer. Her paternal aunt  at 96yo.  Her PGF  in his 50-60's from stomach cancer.  Her PGM  in her 30's from a stroke and had a history of HTN.  Consanguinity is denied. Ancestry is Ashkenazi Faith on both sides of the family.    Review of Systems:  A full review of systems was reviewed with the family.  Pertinent positives listed in the HPI.  All other systems negative.      MEDICATIONS:     Current Outpatient Medications:     ALPRAZolam (Xanax) 0.25 mg tablet, Take 1 tablet (0.25 mg) by mouth as needed at bedtime for anxiety., Disp: 90 tablet, Rfl: 0    buPROPion XL (Wellbutrin XL) 300 mg 24  hr tablet, TAKE 1 TABLET (300 MG) BY MOUTH ONCE DAILY IN THE MORNING. DO NOT CRUSH, CHEW, OR SPLIT., Disp: 90 tablet, Rfl: 3    calcium carbonate-vitamin D3 600 mg-10 mcg (400 unit) tablet, Take 1 tablet by mouth once daily., Disp: , Rfl:     cholecalciferol (Vitamin D-3) 25 MCG (1000 UT) capsule, Take 2 capsules (50 mcg) by mouth once daily., Disp: , Rfl:     clobetasol (Temovate) 0.05 % external solution, APPLY TO SCALP DAILY AS DIRECTED, Disp: , Rfl:     escitalopram (Lexapro) 5 mg tablet, Take 1 tablet (5 mg) by mouth once daily., Disp: 90 tablet, Rfl: 3    losartan (Cozaar) 50 mg tablet, Take 1 tablet (50 mg) by mouth once daily., Disp: 90 tablet, Rfl: 3    mupirocin (Bactroban) 2 % ointment, APPLY TO AFFECTED AREA ON ELBOWS DAILY AND COVER WITH A BANDAGE., Disp: , Rfl:     pentoxifylline (Trental) 400 mg ER tablet, Take 1 tablet (400 mg) by mouth every 12 hours., Disp: , Rfl:     valACYclovir (Valtrex) 1 gram tablet, , Disp: , Rfl:     vitamin E 450 mg (1000 unit) capsule, Take by mouth once daily., Disp: , Rfl:     denosumab (Prolia) 60 mg/mL syringe, Inject 1 mL (60 mg) under the skin 1 time. (Patient not taking: Reported on 11/5/2024), Disp: , Rfl:     magnesium oxide 400 mg magnesium capsule, Take by mouth once daily. (Patient not taking: Reported on 11/5/2024), Disp: , Rfl:     urea (Carmol) 40 % cream, APPLY TO FEET DAILY (Patient not taking: Reported on 11/5/2024), Disp: , Rfl:     ALLERGIES:   Rachael is allergic to mirtazapine.  Objective:     Physical Exam      HEENT Normocephalic with normal hair distribution and pattern; symmetric face; pupils equal, round, and reactive to light bilaterally; normal nose; palate intact; uvula single and midline.  Symmetric facial movements.  Dentition is present.   Neck Supple with no extra skin or webbing.   Chest Clear to auscultation bilaterally; no SOB.   CV Regular rate and rhythm with no murmurs.   Abdomen Soft, nondistended NT to palpation; bowel sounds present.    Extremities Normally formed digits with normal nails; moves all ext.   Skin No visible areas of hypopigmentation or rashes.   Neuro Alert, casual gait wnl.       Assessment and Plan:   Rachael is a 64 y.o. female with a history of low levels of alkaline phosphatase, osteoporosis, and torus mandibularis.  There is no gene that is currently associated with torus mandibularis (OMIM 792490).  When searching pubmed, there was no association between torus mandibularis and hypophosphatasia (HPP).   Although a low alkaline phosphatase can be seen in hypophosphatasia, a low level of alkaline phosphatase is nonspecific. She does not have a history of premature loss of primary teeth, losing teeth with the root intact, poorly healing fractures, nephrocalcinosis, chondrocalcinosis, seizures, or atypical femur fractures.  Discussed that ALPL is the gene associated with HPP and can be inherited in an autosomal dominant or autosomal recessive manner (GEORGI 180612).   Discussed the risks and benefits of genetic testing and offered genetic testing for ALPL.  Genetic testing of ALPL was deferred at this time. Her bone health is already being monitored.  The current treatment for patients with HPP is asfostase alpha, which is indicated for , infantile and juvenile onset HPP.  Patients who have HPP are recommended to avoid bisphosphonates and excess vitamin D.  She had received bisphosphonates in the past, but is not currently on a bisphosphonate.  Of note, she has Ashkenazi [place] ancestry on both sides of the family and a family history of cancer(s). Briefly discussed cancer genetics and SVETLANA. Will refer to cancer genetics, but also gave her information on how to make an appointment.      - Cancer genetics evaluation  - Declined ALPL genetic testing at this time      We would like Rachael to follow up in clinic if new questions or concerns arise. An appointment can be made by calling [phone].     The information we discussed  is what is known as of this date. With the rapid pace of medical and genetic research, new discoveries may modify our assessment and approach to this patient and/or family in the future.     All of the patient's questions were answered and contact information was provided.       Thank you for involving us with the care of Rachael.      This was a clinical encounter in which I spent greater than 105 minutes engaged in activities related to this visit which included records review, preparing to see the patient, selecting testing pedigree analysis, completing the evaluation, counseling, documentation, and coordination.  We discussed HPP, genetic principles including inheritance, genetic testing options, possible outcomes, and reasoning for further studies.      Jamila Lorenzana MD  Medical Geneticist

## 2024-11-05 NOTE — LETTER
11/05/24    Sharon Welch MD  [address]  [facility] [address]  [place] [address]      Dear Dr. Sharon Welch MD,    Thank you for referring your patient, Rachael Meredith, to receive care in my office. I have enclosed a summary of the care provided to Rachael on 11/05/24.    Please contact me with any questions you may have regarding the visit.    Sincerely,         Jamila Lorenzana MD  [address]  [facility] [address]  [facility] [address]  [phone]    CC: No Recipients

## 2024-11-05 NOTE — PATIENT INSTRUCTIONS
Thank you for visiting the  Genetics Clinic.     Questions and concerns were addressed. The plan was reviewed as outlined below.    Initial recommendations:  1) Cancer genetics evaluation  2) If you change your mind in the future and would like to test the ALPL gene for hypophosphatasia, please come back to genetics clinic.    The clinic note with full evaluation and today's final recommendations are to be sent to the referring physician/PCP.    Please call [phone] to schedule Genetics follow-up if other concerns arise.    Jamila Lorenzana MD  Genetic Medicine

## 2024-11-12 DIAGNOSIS — I10 BENIGN ESSENTIAL HYPERTENSION: ICD-10-CM

## 2024-11-12 RX ORDER — LOSARTAN POTASSIUM 50 MG/1
50 TABLET ORAL DAILY
Qty: 90 TABLET | Refills: 3 | Status: SHIPPED | OUTPATIENT
Start: 2024-11-12

## 2024-12-12 ENCOUNTER — OFFICE VISIT (OUTPATIENT)
Facility: CLINIC | Age: 64
End: 2024-12-12
Payer: MEDICARE

## 2024-12-12 ENCOUNTER — HOSPITAL ENCOUNTER (OUTPATIENT)
Dept: RADIOLOGY | Facility: CLINIC | Age: 64
Discharge: HOME | End: 2024-12-12
Payer: MEDICARE

## 2024-12-12 VITALS
BODY MASS INDEX: 20.24 KG/M2 | SYSTOLIC BLOOD PRESSURE: 132 MMHG | TEMPERATURE: 97.7 F | DIASTOLIC BLOOD PRESSURE: 84 MMHG | HEIGHT: 62 IN | HEART RATE: 66 BPM | WEIGHT: 110 LBS

## 2024-12-12 DIAGNOSIS — M54.17 LUMBOSACRAL RADICULOPATHY: ICD-10-CM

## 2024-12-12 DIAGNOSIS — M54.17 LUMBOSACRAL RADICULOPATHY: Primary | ICD-10-CM

## 2024-12-12 PROCEDURE — 72120 X-RAY BEND ONLY L-S SPINE: CPT

## 2024-12-12 PROCEDURE — 72110 X-RAY EXAM L-2 SPINE 4/>VWS: CPT | Performed by: RADIOLOGY

## 2024-12-12 PROCEDURE — 72148 MRI LUMBAR SPINE W/O DYE: CPT

## 2024-12-12 ASSESSMENT — PATIENT HEALTH QUESTIONNAIRE - PHQ9
1. LITTLE INTEREST OR PLEASURE IN DOING THINGS: NOT AT ALL
2. FEELING DOWN, DEPRESSED OR HOPELESS: NOT AT ALL
SUM OF ALL RESPONSES TO PHQ9 QUESTIONS 1 & 2: 0

## 2024-12-12 ASSESSMENT — PAIN SCALES - GENERAL: PAINLEVEL_OUTOF10: 9

## 2024-12-12 NOTE — PROGRESS NOTES
[facility]  Department of Neurological Surgery  New Patient Visit    History of Present Illness:  Rachael Meredith is a 64 y.o. year old female who presents to the spine clinic with low back pain. She has been having ongoing back pain and a known disk extrusion with contact of L3 nerve root. She had bilateral L3 epidural steroid injection in  and Feb and has been doing PT. Traction has helped the most. Strengthening the core also helps but she cannot run and cannot exercise the way she would like to. She reports constant pain. Can go across her lower back on the left side and into her hip and gluteus.      Prior Spine Surgeries: none    Physical Therapy: yes    Diabetic: no     Osteoporosis: no  No DXA results found for the past 12 months    Patient's BMI is Body mass index is 20.24 kg/m².    Review of Systems:   systems reviewed and negative other than what is listed in the history of present illness    Patient Active Problem List   Diagnosis    Benign essential hypertension    Borderline glaucoma    Glucose intolerance (impaired glucose tolerance)    Insomnia due to stress    Osteoporosis    Functional bowel disorder    Osteoporosis, post-menopausal    Myalgia    Osteoarthritis of right knee    Cough    Lumbosacral radiculopathy     Past Medical History:   Diagnosis Date    Herpes genitalis     History of torn meniscus of right knee     Unilateral primary osteoarthritis, right knee      Past Surgical History:   Procedure Laterality Date    BREAST BIOPSY      Open     SECTION, CLASSIC      MOUTH SURGERY      Oral Surgery Tooth Extraction     Social History     Tobacco Use    Smoking status: Former     Current packs/day: 0.00     Types: Cigarettes     Quit date:      Years since quittin.9    Smokeless tobacco: Never   Substance Use Topics    Alcohol use: Yes     Comment: socially     family history includes Diabetes in her father; Heart attack (age of onset: 74) in her  mother; Hypertension in her father; Stroke in her father; Stroke (age of onset: 34) in her maternal grandmother.    Current Outpatient Medications:     ALPRAZolam (Xanax) 0.25 mg tablet, Take 1 tablet (0.25 mg) by mouth as needed at bedtime for anxiety., Disp: 90 tablet, Rfl: 0    buPROPion XL (Wellbutrin XL) 300 mg 24 hr tablet, TAKE 1 TABLET (300 MG) BY MOUTH ONCE DAILY IN THE MORNING. DO NOT CRUSH, CHEW, OR SPLIT., Disp: 90 tablet, Rfl: 3    calcium carbonate-vitamin D3 600 mg-10 mcg (400 unit) tablet, Take 1 tablet by mouth once daily., Disp: , Rfl:     cholecalciferol (Vitamin D-3) 25 MCG (1000 UT) capsule, Take 2 capsules (50 mcg) by mouth once daily., Disp: , Rfl:     clobetasol (Temovate) 0.05 % external solution, APPLY TO SCALP DAILY AS DIRECTED, Disp: , Rfl:     escitalopram (Lexapro) 5 mg tablet, Take 1 tablet (5 mg) by mouth once daily., Disp: 90 tablet, Rfl: 3    losartan (Cozaar) 50 mg tablet, TAKE 1 TABLET BY MOUTH EVERY DAY, Disp: 90 tablet, Rfl: 3    magnesium oxide 400 mg magnesium capsule, Take by mouth once daily., Disp: , Rfl:     pentoxifylline (Trental) 400 mg ER tablet, Take 1 tablet (400 mg) by mouth every 12 hours., Disp: , Rfl:     raloxifene (Evista) 60 mg tablet, Take 1 tablet (60 mg) by mouth once daily., Disp: , Rfl:     valACYclovir (Valtrex) 1 gram tablet, , Disp: , Rfl:     vitamin E 450 mg (1000 unit) capsule, Take by mouth once daily., Disp: , Rfl:     mupirocin (Bactroban) 2 % ointment, APPLY TO AFFECTED AREA ON ELBOWS DAILY AND COVER WITH A BANDAGE. (Patient not taking: Reported on 12/12/2024), Disp: , Rfl:   Allergies   Allergen Reactions    Mirtazapine Other and Agitation     Other Reaction(s): Other       Physical Examination    General: Well developed, awake/alert/oriented x3, no distress, alert and cooperative  Skin: Warm and dry, no lesions, no rashes  ENMT: Mucous membranes moist, no apparent injury, no lesions seen  Head/Neck: Neck Supple, no apparent  injury  Respiratory/Thorax: Normal breath sounds with good chest expansion, thorax symmetric  Cardiovascular: No pitting edema, no JVD    Motor Strength: 5/5 Throughout all extremities    Muscle Bulk: Normal and symmetric in all extremities    Posture:   -- Cervical: Normal  -- Thoracic: Normal  -- Lumbar : Normal  Paraspinal muscle spasm/tenderness absent.     Sensation: intact to light touch    L3 radiculopathy  Severe back pain    Results    I personally reviewed and interpreted the imaging results which included MRI L spine showing L3-4 grade 1 spondylolisthesis and severe degenerative disc disease and mild scoliosis.     Assessment and Plan:    Rachael Meredith is a 64 y.o. year old female who presents to the spine clinic with low back pain. She has been having ongoing back pain and a known disk extrusion with contact of L3 nerve root. She had bilateral L3 epidural steroid injection in Jan and Feb and has been doing PT. Traction has helped the most. Strengthening the core also helps but she cannot run and cannot exercise the way she would like to. She reports constant pain. Can go across her lower back on the left side and into her hip and gluteus.      At this time, I will get an updated MRI, XR, CT for surgical planning. I discussed surgery with her as a last resort.        I have reviewed imaging and diagnosis with the patient, discussed the natural history of their disease and both non-operative and operative treatments available and rationale vs risks for both.    The patient’s clinical symptoms correlates well with the radiological findings. Patient has been having significant functional impairment with decreased ability to perform her normal activities of daily living. They have tried treatment options including medications (NSAIDs/narcotics/muscle relaxants/membrane stabilizers), formal physical therapy, and injections.    I offered the option of surgery that would consist of a L3-4 direct lateral interbody  fusion with L3-4 percutaneous pedicle screws.    While there is no evidence of instability in the form of pars defect or spondylolisthesis or prior discectomies; because of the presence of foraminal and extraforaminal severe stenosis, extensive decompression with removal of almost the entire facet in the form of complete facetectomy /resection of pars interarticularis or more than 75% of the facet will have to be performed at the operative levels that will result in destabilization of the spine/intraoperative spinal instability and hence would require concomitant stabilization by placement of pedicle screws. I believe that not performing a fusion and instrumentation following the extensive decompression will be a suboptimal treatment and leave the spine destabilized with potential worsening of her symptoms and development of spinal deformity that may require a much bigger revision surgery that currently planned.    I have explained the surgical procedure in detail with expected duration and extent of recovery along risks of surgery that include, but is not limited to bleeding, infection, blood vessel injury or damage, loss of sensation, loss of bladder, bowel or sexual function, nerve injury/damage resulting in weakness/paralysis, malunion, nonunion, CSF leak, brachial plexus injury, peripheral vision blindness, failure of implants/fusion, failure to relieve symptoms, recurrent disease, adjacent segment disease, need to reoperate for any reason and general anesthesia reaction such as stroke, coma, heart attack, delirium, confusion, death as well as worsening of preexisted medical conditions.    All questions were answered and the patient left satisfied with the surgical plan moving forward.    I have reviewed all prior documentation and reviewed the electronic medical record since admission. I have personally have reviewed all advanced imaging not just the reports and used my interpretation as documented as the  relevant findings. I have reviewed the risks and benefits of all treatment recommendations listed in this note with the patient and family.       The above clinical summary has been dictated with voice recognition software. It has not been proofread for grammatical errors, typographical mistakes, or other semantic inconsistencies.    Thank you for visiting our office today. It was our pleasure to take part in your healthcare.     Do not hesitate to call with any questions regarding your plan of care after leaving at [phone] M-F 8am-4pm.     To clinicians, thank you very much for this kind referral. It is a privilege to partner with you in the care of your patients. My office would be delighted to assist you with any further consultations or with questions regarding the plan of care outlined. Do not hesitate to call the office or contact me directly.       Sincerely,      Jeb Evans MD, [facility]  Spine , [facility]  Kaushik Gil Chair in Spinal Neurosurgery  Complex Spine Surgery Fellowship Director   of Neurological Surgery  [facility] [employer]  Phone: [phone]  Fax: [phone]        Scribe Attestation  By signing my name below, I, Valentina Adri , Danilo   attest that this documentation has been prepared under the direction and in the presence of Jeb Evans MD.

## 2024-12-13 ENCOUNTER — HOSPITAL ENCOUNTER (OUTPATIENT)
Dept: RADIOLOGY | Facility: CLINIC | Age: 64
Discharge: HOME | End: 2024-12-13
Payer: MEDICARE

## 2024-12-13 ENCOUNTER — LAB (OUTPATIENT)
Dept: LAB | Facility: LAB | Age: 64
End: 2024-12-13
Payer: MEDICARE

## 2024-12-13 DIAGNOSIS — R74.8 LOW SERUM ALKALINE PHOSPHATASE: Primary | ICD-10-CM

## 2024-12-13 DIAGNOSIS — E87.1 HYPONATREMIA: ICD-10-CM

## 2024-12-13 DIAGNOSIS — R74.8 LOW SERUM ALKALINE PHOSPHATASE: ICD-10-CM

## 2024-12-13 LAB
ALBUMIN SERPL BCP-MCNC: 4.9 G/DL (ref 3.4–5)
ALP SERPL-CCNC: 46 U/L (ref 33–136)
ANION GAP SERPL CALC-SCNC: 14 MMOL/L (ref 10–20)
BUN SERPL-MCNC: 22 MG/DL (ref 6–23)
CALCIUM SERPL-MCNC: 10 MG/DL (ref 8.6–10.6)
CHLORIDE SERPL-SCNC: 99 MMOL/L (ref 98–107)
CO2 SERPL-SCNC: 26 MMOL/L (ref 21–32)
CREAT SERPL-MCNC: 0.84 MG/DL (ref 0.5–1.05)
EGFRCR SERPLBLD CKD-EPI 2021: 77 ML/MIN/1.73M*2
GLUCOSE SERPL-MCNC: 105 MG/DL (ref 74–99)
PHOSPHATE SERPL-MCNC: 4.3 MG/DL (ref 2.5–4.9)
POTASSIUM SERPL-SCNC: 4 MMOL/L (ref 3.5–5.3)
SODIUM SERPL-SCNC: 135 MMOL/L (ref 136–145)

## 2024-12-13 PROCEDURE — 72131 CT LUMBAR SPINE W/O DYE: CPT

## 2024-12-13 PROCEDURE — 36415 COLL VENOUS BLD VENIPUNCTURE: CPT

## 2024-12-18 ENCOUNTER — APPOINTMENT (OUTPATIENT)
Dept: PHYSICAL MEDICINE AND REHAB | Facility: CLINIC | Age: 64
End: 2024-12-18
Payer: MEDICARE

## 2024-12-18 VITALS
SYSTOLIC BLOOD PRESSURE: 131 MMHG | TEMPERATURE: 97.5 F | HEART RATE: 64 BPM | WEIGHT: 110 LBS | HEIGHT: 62 IN | OXYGEN SATURATION: 99 % | DIASTOLIC BLOOD PRESSURE: 77 MMHG | BODY MASS INDEX: 20.24 KG/M2

## 2024-12-18 DIAGNOSIS — M54.17 LUMBOSACRAL RADICULOPATHY: ICD-10-CM

## 2024-12-18 DIAGNOSIS — M53.3 SACROILIAC JOINT DYSFUNCTION OF LEFT SIDE: Primary | ICD-10-CM

## 2024-12-18 DIAGNOSIS — M79.18 MYOFASCIAL PAIN: ICD-10-CM

## 2024-12-18 PROCEDURE — 99204 OFFICE O/P NEW MOD 45 MIN: CPT | Performed by: PHYSICAL MEDICINE & REHABILITATION

## 2024-12-18 PROCEDURE — G2211 COMPLEX E/M VISIT ADD ON: HCPCS | Performed by: PHYSICAL MEDICINE & REHABILITATION

## 2024-12-18 RX ORDER — MELOXICAM 15 MG/1
7.5-15 TABLET ORAL DAILY
Qty: 30 TABLET | Refills: 2 | Status: SHIPPED | OUTPATIENT
Start: 2024-12-18 | End: 2025-03-18

## 2024-12-18 ASSESSMENT — PAIN SCALES - GENERAL: PAINLEVEL_OUTOF10: 6

## 2024-12-18 NOTE — LETTER
December 18, 2024     Sharon Welch MD  [address] [facility], [address]  [facility] [address]    Patient: Rachael Meredith   YOB: 1960   Date of Visit: 12/18/2024       Dear Dr. Sharon Welch MD:    Thank you for referring Rachael Meredith to me for evaluation. Below are my notes for this consultation.  If you have questions, please do not hesitate to call me. I look forward to following your patient along with you.       Sincerely,     Lily Sabillon MD      CC: No Recipients  ______________________________________________________________________________________    Chief complaint: Low back pain     Dear Sharon Aly MD    I had the pleasure of seeing your patient, Rachael Meredith, in clinic today. As you know, She is a pleasant 64 y.o. right handed female, with past medical history significant for osteoporosis on Prolia, skeletal dysplasia, OA, HTN, anxiety/depression, and known lumbar radiculopathy who presents for evaluation of low back pain.     TIMELINE OF COMPLAINT(S):    She has been having ongoing back pain for 18 months. The pain initially started after a mechanical fall that occurred while running when she tripped and landed on her front with headstrike. She did not have back pain initially from the accident, but gradually noticed pain 1 month after fall. Her pain has since worsened, and become debilitating within the last 2-3 months. She was regularly active, engaged in exercise, pilates and now is unable to do these activities.    She has a known disk extrusion with contact of L3 nerve root. She had bilateral L3 epidural steroid injection in Jan and Feb and has been doing PT. Most recently she completed PT in November, 2024 and did not find any improvement in pain. The pain limits her daily activities, is constant throughout the day and is associated with any prolonged activities. Has tried several conservative measures over the years, and recently saw a neuro surgeon  for evaluation.  From this appointment on 12/12/24 she was offered surgery as a last resort, and patient would like to pursue second opinion for her chronic low back pain.    Overall, the low back pain is worse than the radiating buttock and thigh pain, 70/30% respectively    She denies any red flags.      Note from Dr. Evans on 12/12/24 personally reviewed today. MRI L spine showing L3-4 grade 1 spondylolisthesis and severe degenerative disc disease and mild scoliosis.  Offered L3-L4 direct lateral interbody fusion with L3-L4 percutaneous pedicle screws.    Note from Dr. Welch on 9/11/2024 personally reviewed today.  Pain consistent with L3 dermatome, and referred to neurosurgeon for conversation regarding procedures, or decompression.      Pain:  LOCATION-lower left back  RADIATION-yes, into left buttocks, and proximal posterior lateral thigh occasionally and L anterior hip with prolonged walking.   CONSTANT or INTERMITTENT- Constant  SEVERITY/QUANTITY-6  QUALITY- aching  WEAKNESS- Yes  NUMBNESS/TINGLING- No  ASSOCIATED WITH- Decline in flexibility and balance, no falls  EXACERBATED BY- Anything prolonged, one position lying, sitting, standing, walking  BETTER WITH- Heat, Ibuprofen, moderate walking, icyhot temp relief  TRIED- Tylenol doesn't help, CBD didn't help      Anti-Inflammatories: Ibuprofen helps, previously naproxen didn't help as much as ibuprofen, steroid Dosepaks helped      Muscle relaxants:      Anti-depressants: Alprazolam, Bupropion, Escitalopram      Neuroleptics: previously Gabapentin (300 mg at night stopped taking due to insomnia and didn't like how she felt)      LDN:    PHYSICAL THERAPY: Yes, 3 times for 8 weeks (most recent session ended 11/24). she does HEP regularly but much less and more limited than before because of pain.   CHIROPRACTIC MANIPULATION: No  TENS unit: No  ACUPUNCTURE TREATMENTS: Yes  DEEP TISSUE MASSAGE THERAPY: Yes  OSTEOPATHIC MANIPULATION THERAPY: No    EMG/NCS:  No    INJECTIONS:   -2/5/24 and 1/12/24 Dr. Zurita has had b/l L3 epidural steroid injections (did not notice any improvement)    IMAGING: Yes    === 12/12/24 ===  MR LUMBAR SPINE WO CONTRAST  - Impression -  Significant progression of degenerative disc disease L3-L4 mainly  reflecting more pronounced Modic type 1 endplate signal changes  (endplate edema) and increased disc height loss, which can be a  source of localized back pain. Additionally, there is slightly  worsening of moderate canal stenosis due to enlargement of diffuse  disc bulge superimposed upon moderate facet and ligamentum flavum  thickening causing mild mass effect on the ventral and dorsal nerve  roots. There remains similar mild-moderate left foraminal stenosis.  Additional levels are detailed above.    === 12/12/24 ===  CT LUMBAR SPINE WO IV CONTRAST  - Impression -  No acute osseous abnormality.  Better characterized on MRI examination, there is degenerative  anterolisthesis of L3 on L4 as well as mild right lateral subluxation  of L3 on L4 and levo scoliosis of the lower lumbar spine. There is a  moderate-to-large left central/subarticular disc extrusion with  cranial migration that contributes to moderate to severe spinal canal  stenosis with left-greater-than-right L3 subarticular stenosis and  moderate left neural foraminal narrowing.    === 12/12/24 ===  XR LUMBAR SPINE 4+ VIEWS WITH FLEXION EXTENSION  - Impression -  Mild anterolisthesis of L3 on L4. Moderate spondylosis at L3-L4        Lab Results   Component Value Date    HGBA1C 6.1 (H) 09/16/2024       FUNCTIONAL HISTORY: The patient is independent in all ADLs, mobility, and driving. The patient does not use any assistive device.    SH:  Lives in: [facility]  Lives with: Self  Occupation: Retired  Tobacco: Former, quit in 1982  Alcohol: Yes, socially  Drugs: No  __________________________________    ROS: The patient denies any bowel or bladder incontinence/accidents, night sweats,  fevers, chills, recent significant weight loss. A 14 point review of systems was reviewed with the patient and is as above and otherwise negative.  ROS questionnaire positive for weakness, poor balance, difficulty walking for prolonged periods of time, muscle pain/tightness, back pain, sleep disturbances, limited range of motion    Physical Exam  Constitutional:           Comments: Lower back pain.          PHYSICAL EXAM  GEN - Alert, well-developed, well-nourished, no acute distress  PSYCH - Cooperative, appropriate mood and affect  HEENT - NC/AT  RESP - Non-labored respirations, equal expansion  CV - warm and well-perfused, No cyanosis or edema in extremities.  ABD- soft, ND  SKIN - No rash.    BACK/SPINE - Flattening of the lumbar spine. No erythema, edema, or swelling.  Mild left sided back pain with lumbar extension. No pain with flexion, rotation, or side bend. No pain with facet loading. No tenderness of lumbosacral spinous processes. Significant tenderness over the left lumbar paraspinal muscles. No tenderness over the iliolumbar ligaments bilaterally. No TTP of bilateral PSIS, sacral sulcus, gluteus medius, piriformis, greater trochanters, or IT band. Sacral compression negative bilaterally. Straight leg raise negative bilaterally.     HIPS/PELVIS - Symmetric in standing and lying. Passive hip flexion, internal rotation, and external rotation within functional normal limits bilaterally without provocation of pain symptoms. No tenderness over iliopsoas tendon. No tenderness of SI with direct palpation. Positive FABERs on left, negative on right. Negative hip clicking. Negative hip impingement test. Negative log roll. Negative resisted active SLR. No pain with deep hip flexion.     NEURO -   LE strength 5/5 -  including hip flexors, knee flexors, knee extensors, ankle dorsiflexors, ankle plantar flexors, and EHL   Sensation - intact to light touch in bilateral lower extremities.   Reflexes - Brisk 3+ reflexes  in biceps, brachioradialis, triceps, patellar and Achilles reflexes bilaterally. No Clonus, No Babinski, Hernandez's negative bilaterally  GAIT - Normal base, normal stride length, non-antalgic. Able to toe walk and heel walk without difficulty.     IMPRESSION:    This is a pleasant 64 y.o. right handed female, with past medical history significant for osteoporosis on Prolia, skeletal dysplasia, OA, HTN, anxiety/depression, and known lumbar radiculopathy who presents for evaluation of low back pain. The patient presents with 18 months of ongoing back pain and known disc extrusion, and has failed several conservative measures. Recently met with neurosurgeon Dr. Evans who offered surgery as last resort. On exam today, patient lacks any evidence of neurologic compromise although there was some hyperreflexia throughout upper and lower extremities. There are no red flags in her history or on physical exam. On exam, there is significant left lumbar paraspinal hypertonicity and trigger points that reproduce her pain. Positive ELVIE on left, however negative SI joint tenderness on palpation. Physical exam and symptoms are consistent with known disc herniation and reactive myofascial pain, and additional SI joint dysfunction. The patient would like to follow the plan outlined below before undergoing surgery:    1. Patient Education: Extensive time was spent educating the patient on relevant anatomy, clinical findings and imaging, as well as discussing the potential diagnoses as discussed above.      2. Pharmacology: Pt can continue to use NSAIDs as needed during flare-ups. Pt was also encouraged to use ice/heat and massage for flare-ups. Patient was prescribed course of meloxicam, and was educated regarding the uses, benefits and risks. Consider muscle relaxants, such as robaxin, in future if unresponsive to meloxicam.     3. Exercise: Patient has already completed several bouts of PT, with each session becoming less  beneficial. Modalities such as US, heat/ice, TENS to decrease pain can also be employed. We discussed the importance of maintaining a daily exercise program, including stretching and strengthening. Preventative strategies were reviewed, specifically avoidance of any exercises that exacerbate pain.     4. Imaging: Recent lumbar MRI in system. No additional imaging at this time     5. Interventional: Recommend follow up for left lumbar TPI. Consider US guided SI joint injection in future.      6. Ergonomics: Discussed positional and mechanical changes to implement while at home or during ambulation/activity to relieve chronic stress and tightness to lumbar spine.      7. Return to clinic for follow-up with me for first available TPI appointment. Can set up future SI joint injection at next appointment if appropriate. Recommended follow up with surgeon as scheduled.         The patient expressed understanding and agreement with the assessment and plan. Patient encouraged to contact us should they have any questions, concerns, or any changes in symptoms.      Thank you for allowing me to participate in the care of your patient.       Ry Cochran, DO  PM&R, PGY-2      Patient seen and discussed with the resident. I agree with the above assessment and plan.       ** Dictated with voice recognition software, please forgive any errors in grammar and/or spelling **

## 2024-12-18 NOTE — PATIENT INSTRUCTIONS
-Start meloxicam 7.5-15 mg daily in the morning with food. Do not take ibuprofen with this.  -Consider other medications in the future  -Continue working on your core exercises, this is the most important  -Consider injections: Trigger point injections (handout provided), ultrasound-guided left SI joint injection  -Proceed with surgery as a last resort  -Follow-up next available trigger point injections, 15-minute visit

## 2024-12-18 NOTE — PROGRESS NOTES
Chief complaint: Low back pain     Dear Sharon Aly MD    I had the pleasure of seeing your patient, Rachael Meredith, in clinic today. As you know, She is a pleasant 64 y.o. right handed female, with past medical history significant for osteoporosis on Prolia, skeletal dysplasia, OA, HTN, anxiety/depression, and known lumbar radiculopathy who presents for evaluation of low back pain.     TIMELINE OF COMPLAINT(S):    She has been having ongoing back pain for 18 months. The pain initially started after a mechanical fall that occurred while running when she tripped and landed on her front with headstrike. She did not have back pain initially from the accident, but gradually noticed pain 1 month after fall. Her pain has since worsened, and become debilitating within the last 2-3 months. She was regularly active, engaged in exercise, pilates and now is unable to do these activities.    She has a known disk extrusion with contact of L3 nerve root. She had bilateral L3 epidural steroid injection in Jan and Feb and has been doing PT. Most recently she completed PT in November, 2024 and did not find any improvement in pain. The pain limits her daily activities, is constant throughout the day and is associated with any prolonged activities. Has tried several conservative measures over the years, and recently saw a neuro surgeon for evaluation.  From this appointment on 12/12/24 she was offered surgery as a last resort, and patient would like to pursue second opinion for her chronic low back pain.    Overall, the low back pain is worse than the radiating buttock and thigh pain, 70/30% respectively    She denies any red flags.      Note from Dr. Evans on 12/12/24 personally reviewed today. MRI L spine showing L3-4 grade 1 spondylolisthesis and severe degenerative disc disease and mild scoliosis.  Offered L3-L4 direct lateral interbody fusion with L3-L4 percutaneous pedicle screws.    Note from Dr. Welch on 9/11/2024  personally reviewed today.  Pain consistent with L3 dermatome, and referred to neurosurgeon for conversation regarding procedures, or decompression.      Pain:  LOCATION-lower left back  RADIATION-yes, into left buttocks, and proximal posterior lateral thigh occasionally and L anterior hip with prolonged walking.   CONSTANT or INTERMITTENT- Constant  SEVERITY/QUANTITY-6  QUALITY- aching  WEAKNESS- Yes  NUMBNESS/TINGLING- No  ASSOCIATED WITH- Decline in flexibility and balance, no falls  EXACERBATED BY- Anything prolonged, one position lying, sitting, standing, walking  BETTER WITH- Heat, Ibuprofen, moderate walking, icyhot temp relief  TRIED- Tylenol doesn't help, CBD didn't help      Anti-Inflammatories: Ibuprofen helps, previously naproxen didn't help as much as ibuprofen, steroid Dosepaks helped      Muscle relaxants:      Anti-depressants: Alprazolam, Bupropion, Escitalopram      Neuroleptics: previously Gabapentin (300 mg at night stopped taking due to insomnia and didn't like how she felt)      LDN:    PHYSICAL THERAPY: Yes, 3 times for 8 weeks (most recent session ended 11/24). she does HEP regularly but much less and more limited than before because of pain.   CHIROPRACTIC MANIPULATION: No  TENS unit: No  ACUPUNCTURE TREATMENTS: Yes  DEEP TISSUE MASSAGE THERAPY: Yes  OSTEOPATHIC MANIPULATION THERAPY: No    EMG/NCS: No    INJECTIONS:   -2/5/24 and 1/12/24 Dr. Zurita has had b/l L3 epidural steroid injections (did not notice any improvement)    IMAGING: Yes    === 12/12/24 ===  MR LUMBAR SPINE WO CONTRAST  - Impression -  Significant progression of degenerative disc disease L3-L4 mainly  reflecting more pronounced Modic type 1 endplate signal changes  (endplate edema) and increased disc height loss, which can be a  source of localized back pain. Additionally, there is slightly  worsening of moderate canal stenosis due to enlargement of diffuse  disc bulge superimposed upon moderate facet and ligamentum  flavum  thickening causing mild mass effect on the ventral and dorsal nerve  roots. There remains similar mild-moderate left foraminal stenosis.  Additional levels are detailed above.    === 12/12/24 ===  CT LUMBAR SPINE WO IV CONTRAST  - Impression -  No acute osseous abnormality.  Better characterized on MRI examination, there is degenerative  anterolisthesis of L3 on L4 as well as mild right lateral subluxation  of L3 on L4 and levo scoliosis of the lower lumbar spine. There is a  moderate-to-large left central/subarticular disc extrusion with  cranial migration that contributes to moderate to severe spinal canal  stenosis with left-greater-than-right L3 subarticular stenosis and  moderate left neural foraminal narrowing.    === 12/12/24 ===  XR LUMBAR SPINE 4+ VIEWS WITH FLEXION EXTENSION  - Impression -  Mild anterolisthesis of L3 on L4. Moderate spondylosis at L3-L4        Lab Results   Component Value Date    HGBA1C 6.1 (H) 09/16/2024       FUNCTIONAL HISTORY: The patient is independent in all ADLs, mobility, and driving. The patient does not use any assistive device.    SH:  [facility] in: [facility]  Lives with: Self  Occupation: Retired  Tobacco: Former, quit in 1982  Alcohol: Yes, socially  Drugs: No  __________________________________    ROS: The patient denies any bowel or bladder incontinence/accidents, night sweats, fevers, chills, recent significant weight loss. A 14 point review of systems was reviewed with the patient and is as above and otherwise negative.  ROS questionnaire positive for weakness, poor balance, difficulty walking for prolonged periods of time, muscle pain/tightness, back pain, sleep disturbances, limited range of motion    Physical Exam  Constitutional:           Comments: Lower back pain.          PHYSICAL EXAM  GEN - Alert, well-developed, well-nourished, no acute distress  PSYCH - Cooperative, appropriate mood and affect  HEENT - NC/AT  RESP - Non-labored respirations, equal  expansion  CV - warm and well-perfused, No cyanosis or edema in extremities.  ABD- soft, ND  SKIN - No rash.    BACK/SPINE - Flattening of the lumbar spine. No erythema, edema, or swelling.  Mild left sided back pain with lumbar extension. No pain with flexion, rotation, or side bend. No pain with facet loading. No tenderness of lumbosacral spinous processes. Significant tenderness over the left lumbar paraspinal muscles. No tenderness over the iliolumbar ligaments bilaterally. No TTP of bilateral PSIS, sacral sulcus, gluteus medius, piriformis, greater trochanters, or IT band. Sacral compression negative bilaterally. Straight leg raise negative bilaterally.     HIPS/PELVIS - Symmetric in standing and lying. Passive hip flexion, internal rotation, and external rotation within functional normal limits bilaterally without provocation of pain symptoms. No tenderness over iliopsoas tendon. No tenderness of SI with direct palpation. Positive FABERs on left, negative on right. Negative hip clicking. Negative hip impingement test. Negative log roll. Negative resisted active SLR. No pain with deep hip flexion.     NEURO -   LE strength 5/5 -  including hip flexors, knee flexors, knee extensors, ankle dorsiflexors, ankle plantar flexors, and EHL   Sensation - intact to light touch in bilateral lower extremities.   Reflexes - Brisk 3+ reflexes in biceps, brachioradialis, triceps, patellar and Achilles reflexes bilaterally. No Clonus, No Babinski, Hernandez's negative bilaterally  GAIT - Normal base, normal stride length, non-antalgic. Able to toe walk and heel walk without difficulty.     IMPRESSION:    This is a pleasant 64 y.o. right handed female, with past medical history significant for osteoporosis on Prolia, skeletal dysplasia, OA, HTN, anxiety/depression, and known lumbar radiculopathy who presents for evaluation of low back pain. The patient presents with 18 months of ongoing back pain and known disc extrusion, and has  failed several conservative measures. Recently met with neurosurgeon Dr. Evans who offered surgery as last resort. On exam today, patient lacks any evidence of neurologic compromise although there was some hyperreflexia throughout upper and lower extremities. There are no red flags in her history or on physical exam. On exam, there is significant left lumbar paraspinal hypertonicity and trigger points that reproduce her pain. Positive ELVIE on left, however negative SI joint tenderness on palpation. Physical exam and symptoms are consistent with known disc herniation and reactive myofascial pain, and additional SI joint dysfunction. The patient would like to follow the plan outlined below before undergoing surgery:    1. Patient Education: Extensive time was spent educating the patient on relevant anatomy, clinical findings and imaging, as well as discussing the potential diagnoses as discussed above.      2. Pharmacology: Pt can continue to use NSAIDs as needed during flare-ups. Pt was also encouraged to use ice/heat and massage for flare-ups. Patient was prescribed course of meloxicam, and was educated regarding the uses, benefits and risks. Consider muscle relaxants, such as robaxin, in future if unresponsive to meloxicam.     3. Exercise: Patient has already completed several bouts of PT, with each session becoming less beneficial. Modalities such as US, heat/ice, TENS to decrease pain can also be employed. We discussed the importance of maintaining a daily exercise program, including stretching and strengthening. Preventative strategies were reviewed, specifically avoidance of any exercises that exacerbate pain.     4. Imaging: Recent lumbar MRI in system. No additional imaging at this time     5. Interventional: Recommend follow up for left lumbar TPI. Consider US guided SI joint injection in future.      6. Ergonomics: Discussed positional and mechanical changes to implement while at home or during  ambulation/activity to relieve chronic stress and tightness to lumbar spine.      7. Return to clinic for follow-up with me for first available TPI appointment. Can set up future SI joint injection at next appointment if appropriate. Recommended follow up with surgeon as scheduled.         The patient expressed understanding and agreement with the assessment and plan. Patient encouraged to contact us should they have any questions, concerns, or any changes in symptoms.      Thank you for allowing me to participate in the care of your patient.       Ry Cochran, DO  PM&R, PGY-2      Patient seen and discussed with the resident. I agree with the above assessment and plan.       ** Dictated with voice recognition software, please forgive any errors in grammar and/or spelling **

## 2025-01-02 ENCOUNTER — TELEPHONE (OUTPATIENT)
Dept: PRIMARY CARE | Facility: CLINIC | Age: 65
End: 2025-01-02
Payer: MEDICARE

## 2025-01-02 NOTE — TELEPHONE ENCOUNTER
Spoke to Rachael.  She would like a second opinion. I recommended she consult with Dr. Antonio Cornejo as well.

## 2025-01-10 NOTE — PATIENT INSTRUCTIONS
-Ice on and off for the next 24 hours if injection sites are sore. Do gentle range of motion exercises in each area that was injected. Try to do them every hour for about half a minute or so, in every direction that the affected part goes. No pool, bath, or hot tub today. Avoid heavy lifting for the next 2 days.    -You can stop the meloxicam in several days if it does not help  -Consider other medications in the future  -Continue working on your core exercises, this is the most important  -Consider injections: repeat Trigger point injections (if these helped), ultrasound-guided left SI joint injection  -Proceed with surgery as a last resort  -Follow-up 3-5 weeks, 15 min visit but message me in a week or so if these injections did not help, and we will switch her appointment to an ultrasound-guided SI joint injection if you want.

## 2025-01-10 NOTE — PROGRESS NOTES
Chief complaint: Low back pain follow-up    This is a pleasant 64 y.o. right handed female, with past medical history significant for osteoporosis on Prolia, skeletal dysplasia, OA, HTN, anxiety/depression, and known lumbar radiculopathy who presents for follow-up of low back pain.     She was last seen here on 12/18/2024, at which point I advised her to start meloxicam.    I advised her to continue working on her core exercises.    She is here today for left lumbar and gluteal trigger point injections.    She rates her pain as 5/10, previously 6/10    Otherwise, there have been no changes to her medications or past medical history since last visit    ________________________________________  1/13/2025: Left lumbar and gluteal trigger point injections, stop meloxicam, consider other medications and injections, continue exercises    ______________________________________  As a reminder:    TIMELINE OF COMPLAINT(S):    She has been having ongoing back pain for 18 months. The pain initially started after a mechanical fall that occurred while running when she tripped and landed on her front with headstrike. She did not have back pain initially from the accident, but gradually noticed pain 1 month after fall. Her pain has since worsened, and become debilitating within the last 2-3 months. She was regularly active, engaged in exercise, pilates and now is unable to do these activities.    She has a known disk extrusion with contact of L3 nerve root. She had bilateral L3 epidural steroid injection in Jan and Feb and has been doing PT. Most recently she completed PT in November, 2024 and did not find any improvement in pain. The pain limits her daily activities, is constant throughout the day and is associated with any prolonged activities. Has tried several conservative measures over the years, and recently saw a neuro surgeon for evaluation.  From this appointment on 12/12/24 she was offered surgery as a last resort, and  patient would like to pursue second opinion for her chronic low back pain.    Overall, the low back pain is worse than the radiating buttock and thigh pain, 70/30% respectively    She denies any red flags.      Note from Dr. Evans on 12/12/24 personally reviewed today. MRI L spine showing L3-4 grade 1 spondylolisthesis and severe degenerative disc disease and mild scoliosis.  Offered L3-L4 direct lateral interbody fusion with L3-L4 percutaneous pedicle screws.    Note from Dr. Welch on 9/11/2024 personally reviewed today.  Pain consistent with L3 dermatome, and referred to neurosurgeon for conversation regarding procedures, or decompression.      Pain:  LOCATION-lower left back  RADIATION-yes, into left buttocks, and proximal posterior lateral thigh occasionally and L anterior hip with prolonged walking.   CONSTANT or INTERMITTENT- Constant  SEVERITY/QUANTITY-6  QUALITY- aching  WEAKNESS- Yes  NUMBNESS/TINGLING- No  ASSOCIATED WITH- Decline in flexibility and balance, no falls  EXACERBATED BY- Anything prolonged, one position lying, sitting, standing, walking  BETTER WITH- Heat, Ibuprofen, moderate walking, icyhot temp relief  TRIED- Tylenol doesn't help, CBD didn't help      Anti-Inflammatories: Ibuprofen helps, previously naproxen didn't help as much as ibuprofen, steroid Dosepaks helped      Muscle relaxants:      Anti-depressants: Alprazolam, Bupropion, Escitalopram      Neuroleptics: previously Gabapentin (300 mg at night stopped taking due to insomnia and didn't like how she felt)      LDN:    PHYSICAL THERAPY: Yes, 3 times for 8 weeks (most recent session ended 11/24). she does HEP regularly but much less and more limited than before because of pain.   CHIROPRACTIC MANIPULATION: No  TENS unit: No  ACUPUNCTURE TREATMENTS: Yes  DEEP TISSUE MASSAGE THERAPY: Yes  OSTEOPATHIC MANIPULATION THERAPY: No    EMG/NCS: No    INJECTIONS:   -2/5/24 and 1/12/24 Dr. Zurita has had b/l L3 epidural steroid injections (did  not notice any improvement)    IMAGING: Yes    === 12/12/24 ===  MR LUMBAR SPINE WO CONTRAST  - Impression -  Significant progression of degenerative disc disease L3-L4 mainly  reflecting more pronounced Modic type 1 endplate signal changes  (endplate edema) and increased disc height loss, which can be a  source of localized back pain. Additionally, there is slightly  worsening of moderate canal stenosis due to enlargement of diffuse  disc bulge superimposed upon moderate facet and ligamentum flavum  thickening causing mild mass effect on the ventral and dorsal nerve  roots. There remains similar mild-moderate left foraminal stenosis.  Additional levels are detailed above.    === 12/12/24 ===  CT LUMBAR SPINE WO IV CONTRAST  - Impression -  No acute osseous abnormality.  Better characterized on MRI examination, there is degenerative  anterolisthesis of L3 on L4 as well as mild right lateral subluxation  of L3 on L4 and levo scoliosis of the lower lumbar spine. There is a  moderate-to-large left central/subarticular disc extrusion with  cranial migration that contributes to moderate to severe spinal canal  stenosis with left-greater-than-right L3 subarticular stenosis and  moderate left neural foraminal narrowing.    === 12/12/24 ===  XR LUMBAR SPINE 4+ VIEWS WITH FLEXION EXTENSION  - Impression -  Mild anterolisthesis of L3 on L4. Moderate spondylosis at L3-L4        Lab Results   Component Value Date    HGBA1C 6.1 (H) 09/16/2024       FUNCTIONAL HISTORY: The patient is independent in all ADLs, mobility, and driving. The patient does not use any assistive device.    SH:  Lives in: [facility]  Lives with: Self  Occupation: Retired  Tobacco: Former, quit in 1982  Alcohol: Yes, socially  Drugs: No  __________________________________    ROS: The patient denies any bowel or bladder incontinence/accidents, night sweats, fevers, chills, recent significant weight loss. A 14 point review of systems was reviewed with the  patient and is as above and otherwise negative.  ROS questionnaire positive for back pain    PHYSICAL EXAM  GEN - Alert, well-developed, well-nourished, no acute distress  PSYCH - Cooperative, appropriate mood and affect  HEENT - NC/AT  RESP - Non-labored respirations, equal expansion  CV - warm and well-perfused, No cyanosis or edema in extremities.  ABD- soft, ND  SKIN - No rash.    Previous:    BACK/SPINE - Flattening of the lumbar spine. No erythema, edema, or swelling.  Mild left sided back pain with lumbar extension. No pain with flexion, rotation, or side bend. No pain with facet loading. No tenderness of lumbosacral spinous processes. Significant tenderness over the left lumbar paraspinal muscles. No tenderness over the iliolumbar ligaments bilaterally. No TTP of bilateral PSIS, sacral sulcus, gluteus medius, piriformis, greater trochanters, or IT band. Sacral compression negative bilaterally. Straight leg raise negative bilaterally.     HIPS/PELVIS - Symmetric in standing and lying. Passive hip flexion, internal rotation, and external rotation within functional normal limits bilaterally without provocation of pain symptoms. No tenderness over iliopsoas tendon. No tenderness of SI with direct palpation. Positive FABERs on left, negative on right. Negative hip clicking. Negative hip impingement test. Negative log roll. Negative resisted active SLR. No pain with deep hip flexion.     NEURO -   LE strength 5/5 -  including hip flexors, knee flexors, knee extensors, ankle dorsiflexors, ankle plantar flexors, and EHL   Sensation - intact to light touch in bilateral lower extremities.   Reflexes - Brisk 3+ reflexes in biceps, brachioradialis, triceps, patellar and Achilles reflexes bilaterally. No Clonus, No Babinski, Hernandez's negative bilaterally  GAIT - Normal base, normal stride length, non-antalgic. Able to toe walk and heel walk without difficulty.     PROCEDURE:    Lidocaine 1%- 6 cc's used, 0 cc's  wasted  200mg/20mL (10mg/mL)  NDC 2844-2258-95  LOT [id number]  EXP 01/31/2026  Manuf: [employer]    Lumbar and Gluteal trigger point injections:    Description of the Procedure: The procedure, risks and alternative treatments were discussed with the patient. After written informed consent was obtained, the trigger points in the lumbar paraspinal, gluteal muscles were palpated and marked. The skin was prepped three times with alcohol. Using a 27 gauge 1.5 inch needle, after negative aspiration, the trigger points in each muscle were injected with a total of 6 cc's of 1% lidocaine, spread equally into 5 sites. Twitch responses were observed in the musculature. The patient tolerated the procedure well with no immediate complications or bleeding.      Plan:   1. The patient was instructed in post-procedural care.  2. The patient was asked to apply moist heat and or ice for the next 24 hours and to perform daily gentle stretching exercises.     Physical Exam  Constitutional:                 IMPRESSION:    This is a pleasant 64 y.o. right handed female, with past medical history significant for osteoporosis on Prolia, skeletal dysplasia, OA, HTN, anxiety/depression, and known lumbar radiculopathy who presents for follow-up of low back pain. The patient presents with 18 months of ongoing back pain and known disc extrusion, and has failed several conservative measures. Recently met with neurosurgeon Dr. Evans who offered surgery as last resort. On exam today, patient lacks any evidence of neurologic compromise although there was some hyperreflexia throughout upper and lower extremities. There are no red flags in her history or on physical exam. On exam, there is significant left lumbar paraspinal hypertonicity and trigger points that reproduce her pain. Positive ELVIE on left, however negative SI joint tenderness on palpation. Physical exam and symptoms are consistent with known disc herniation and reactive myofascial pain, and  additional SI joint dysfunction. The patient would like to follow the plan outlined below before undergoing surgery:    -Left lumbar and gluteal trigger point injections performed as above.  There were no complications and she tolerated the procedure well.  She was provided with postprocedure instructions.  -You can stop the meloxicam in several days if it does not help  -Consider other medications in the future  -Continue working on your core exercises, this is the most important  -Consider injections: repeat Trigger point injections (if these helped), ultrasound-guided left SI joint injection  -Proceed with surgery as a last resort  -Follow-up 3-5 weeks, 15 min visit but message me in a week or so if these injections did not help, and we will switch her appointment to an ultrasound-guided SI joint injection if you want.      The patient expressed understanding and agreement with the assessment and plan. Patient encouraged to contact us should they have any questions, concerns, or any changes in symptoms.      Thank you for allowing me to participate in the care of your patient.       ** Dictated with voice recognition software, please forgive any errors in grammar and/or spelling **

## 2025-01-13 ENCOUNTER — APPOINTMENT (OUTPATIENT)
Dept: PHYSICAL MEDICINE AND REHAB | Facility: CLINIC | Age: 65
End: 2025-01-13
Payer: MEDICARE

## 2025-01-13 VITALS
SYSTOLIC BLOOD PRESSURE: 146 MMHG | WEIGHT: 113 LBS | DIASTOLIC BLOOD PRESSURE: 87 MMHG | BODY MASS INDEX: 20.8 KG/M2 | TEMPERATURE: 97.7 F | HEART RATE: 66 BPM | OXYGEN SATURATION: 97 % | HEIGHT: 62 IN

## 2025-01-13 DIAGNOSIS — M54.17 LUMBOSACRAL RADICULOPATHY: ICD-10-CM

## 2025-01-13 DIAGNOSIS — M79.18 MYOFASCIAL PAIN: Primary | ICD-10-CM

## 2025-01-13 DIAGNOSIS — M53.3 SACROILIAC JOINT DYSFUNCTION OF LEFT SIDE: ICD-10-CM

## 2025-01-13 PROCEDURE — 20553 NJX 1/MLT TRIGGER POINTS 3/>: CPT | Performed by: PHYSICAL MEDICINE & REHABILITATION

## 2025-01-13 ASSESSMENT — PAIN SCALES - GENERAL: PAINLEVEL_OUTOF10: 5

## 2025-02-04 ENCOUNTER — APPOINTMENT (OUTPATIENT)
Dept: PHYSICAL MEDICINE AND REHAB | Facility: CLINIC | Age: 65
End: 2025-02-04
Payer: MEDICARE

## 2025-02-04 NOTE — PROGRESS NOTES
Chief complaint: Low back pain follow-up    This is a pleasant 64 y.o. right handed female, with past medical history significant for osteoporosis on Prolia, skeletal dysplasia, OA, HTN, anxiety/depression, and known lumbar radiculopathy who presents for follow-up of low back pain.     She was last seen here on 1/13/2025, at which point I did left lumbar and gluteal trigger point injections.  She messaged me on 1/21/2025 saying that the injections did not help much.  So she is here today for an ultrasound-guided left SI joint injection instead.    Advised her to stop the meloxicam if it did not help.    I advised her to continue working on her core exercises.    She rates her pain as 7/10, previously 5/10    Otherwise, there have been no changes to her medications or past medical history since last visit    ________________________________________  1/13/2025: Left lumbar and gluteal trigger point injections, stop meloxicam, consider other medications and injections, continue exercises  2/5/2025: Left ultrasound-guided SI joint injection, continue exercises, same as above  ______________________________________  As a reminder:    TIMELINE OF COMPLAINT(S):    She has been having ongoing back pain for 18 months. The pain initially started after a mechanical fall that occurred while running when she tripped and landed on her front with headstrike. She did not have back pain initially from the accident, but gradually noticed pain 1 month after fall. Her pain has since worsened, and become debilitating within the last 2-3 months. She was regularly active, engaged in exercise, pilates and now is unable to do these activities.    She has a known disk extrusion with contact of L3 nerve root. She had bilateral L3 epidural steroid injection in Jan and Feb and has been doing PT. Most recently she completed PT in November, 2024 and did not find any improvement in pain. The pain limits her daily activities, is constant throughout  the day and is associated with any prolonged activities. Has tried several conservative measures over the years, and recently saw a neuro surgeon for evaluation.  From this appointment on 12/12/24 she was offered surgery as a last resort, and patient would like to pursue second opinion for her chronic low back pain.    Overall, the low back pain is worse than the radiating buttock and thigh pain, 70/30% respectively    She denies any red flags.      Note from Dr. Evans on 12/12/24 personally reviewed today. MRI L spine showing L3-4 grade 1 spondylolisthesis and severe degenerative disc disease and mild scoliosis.  Offered L3-L4 direct lateral interbody fusion with L3-L4 percutaneous pedicle screws.    Note from Dr. Welch on 9/11/2024 personally reviewed today.  Pain consistent with L3 dermatome, and referred to neurosurgeon for conversation regarding procedures, or decompression.      Pain:  LOCATION-lower left back  RADIATION-yes, into left buttocks, and proximal posterior lateral thigh occasionally and L anterior hip with prolonged walking.   CONSTANT or INTERMITTENT- Constant  SEVERITY/QUANTITY-6  QUALITY- aching  WEAKNESS- Yes  NUMBNESS/TINGLING- No  ASSOCIATED WITH- Decline in flexibility and balance, no falls  EXACERBATED BY- Anything prolonged, one position lying, sitting, standing, walking  BETTER WITH- Heat, Ibuprofen, moderate walking, icyhot temp relief  TRIED- Tylenol doesn't help, CBD didn't help      Anti-Inflammatories: Ibuprofen helps, previously naproxen didn't help as much as ibuprofen, steroid Dosepaks helped      Muscle relaxants:      Anti-depressants: Alprazolam, Bupropion, Escitalopram      Neuroleptics: previously Gabapentin (300 mg at night stopped taking due to insomnia and didn't like how she felt)      LDN:    PHYSICAL THERAPY: Yes, 3 times for 8 weeks (most recent session ended 11/24). she does HEP regularly but much less and more limited than before because of pain.   CHIROPRACTIC  MANIPULATION: No  TENS unit: No  ACUPUNCTURE TREATMENTS: Yes  DEEP TISSUE MASSAGE THERAPY: Yes  OSTEOPATHIC MANIPULATION THERAPY: No    EMG/NCS: No    INJECTIONS:   -2/5/24 and 1/12/24 Dr. Zurita has had b/l L3 epidural steroid injections (did not notice any improvement)    IMAGING: Yes    === 12/12/24 ===  MR LUMBAR SPINE WO CONTRAST  - Impression -  Significant progression of degenerative disc disease L3-L4 mainly  reflecting more pronounced Modic type 1 endplate signal changes  (endplate edema) and increased disc height loss, which can be a  source of localized back pain. Additionally, there is slightly  worsening of moderate canal stenosis due to enlargement of diffuse  disc bulge superimposed upon moderate facet and ligamentum flavum  thickening causing mild mass effect on the ventral and dorsal nerve  roots. There remains similar mild-moderate left foraminal stenosis.  Additional levels are detailed above.    === 12/12/24 ===  CT LUMBAR SPINE WO IV CONTRAST  - Impression -  No acute osseous abnormality.  Better characterized on MRI examination, there is degenerative  anterolisthesis of L3 on L4 as well as mild right lateral subluxation  of L3 on L4 and levo scoliosis of the lower lumbar spine. There is a  moderate-to-large left central/subarticular disc extrusion with  cranial migration that contributes to moderate to severe spinal canal  stenosis with left-greater-than-right L3 subarticular stenosis and  moderate left neural foraminal narrowing.    === 12/12/24 ===  XR LUMBAR SPINE 4+ VIEWS WITH FLEXION EXTENSION  - Impression -  Mild anterolisthesis of L3 on L4. Moderate spondylosis at L3-L4        Lab Results   Component Value Date    HGBA1C 6.1 (H) 09/16/2024       FUNCTIONAL HISTORY: The patient is independent in all ADLs, mobility, and driving. The patient does not use any assistive device.    SH:  Lives in: [facility]  Lives with: Self  Occupation: Retired  Tobacco: Former, quit in 1982  Alcohol:  Yes, socially  Drugs: No  __________________________________    ROS: The patient denies any bowel or bladder incontinence/accidents, night sweats, fevers, chills, recent significant weight loss. A 14 point review of systems was reviewed with the patient and is as above and otherwise negative.  ROS questionnaire negative today    PHYSICAL EXAM  GEN - Alert, well-developed, well-nourished, no acute distress  PSYCH - Cooperative, appropriate mood and affect  HEENT - NC/AT  RESP - Non-labored respirations, equal expansion  CV - warm and well-perfused, No cyanosis or edema in extremities.  ABD- soft, ND  SKIN - No rash.    Previous:    BACK/SPINE - Flattening of the lumbar spine. No erythema, edema, or swelling.  Mild left sided back pain with lumbar extension. No pain with flexion, rotation, or side bend. No pain with facet loading. No tenderness of lumbosacral spinous processes. Significant tenderness over the left lumbar paraspinal muscles. No tenderness over the iliolumbar ligaments bilaterally. No TTP of bilateral PSIS, sacral sulcus, gluteus medius, piriformis, greater trochanters, or IT band. Sacral compression negative bilaterally. Straight leg raise negative bilaterally.     HIPS/PELVIS - Symmetric in standing and lying. Passive hip flexion, internal rotation, and external rotation within functional normal limits bilaterally without provocation of pain symptoms. No tenderness over iliopsoas tendon. No tenderness of SI with direct palpation. Positive FABERs on left, negative on right. Negative hip clicking. Negative hip impingement test. Negative log roll. Negative resisted active SLR. No pain with deep hip flexion.     NEURO -   LE strength 5/5 -  including hip flexors, knee flexors, knee extensors, ankle dorsiflexors, ankle plantar flexors, and EHL   Sensation - intact to light touch in bilateral lower extremities.   Reflexes - Brisk 3+ reflexes in biceps, brachioradialis, triceps, patellar and Achilles  reflexes bilaterally. No Clonus, No Babinski, Hernandez's negative bilaterally  GAIT - Normal base, normal stride length, non-antalgic. Able to toe walk and heel walk without difficulty.     PROCEDURE:    Lidocaine 1%- 4 cc's used, 0 cc's wasted  200mg/20mL (10mg/mL)  NDC 6679-8489-28  LOT [id number]  EXP 01/31/2026  Manuf: [employer]    Kenalog 40- 1 cc's used, 0 cc's wasted  NDC 1742-4850-16  LOT [id number]  EXP 01/2026  Manuf: [employer]        Procedure:  Left SI joint corticosteroid injection:  Please see saved images in the ultrasound tablet    Informed consent obtained. Site confirmed by patient. Time out taken. Ultrasound transmission gel applied and ultrasound images obtained of pre-injection sites.    Longitudinal and transverse views of left SI joints    Description of the Procedure: The procedure, risks and alternative treatments were discussed with the patient. After written informed consent was obtained, the area of most tenderness was identified over the left SI joint while the patient was supine on top of the exam table. The area was marked. Site prepped sterile with chlorhexidine scrub. Ethyl chloride spray used to anesthetize the skin. Using a 27 gauge 3.5 inch spinal needle, after negative aspiration, the area was injected with a total of 4 cc of 1% lidocaine and 1 cc of Kenalog 40 mg/mL was injected under direct US-guidance using in plane transverse approach. Location of medication confirmed by ultrasound in correct site. The patient tolerated the procedure well with no immediate complications or bleeding. No complications occurred.     Plan:   1. The patient was instructed in post-procedural care.  2. The patient was asked to apply moist heat and or ice for the next 24 hours and to perform daily gentle stretching exercises.       Physical Exam  Musculoskeletal:        Back:           IMPRESSION:    This is a pleasant 64 y.o. right handed female, with past medical history significant for  osteoporosis on Prolia, skeletal dysplasia, OA, HTN, anxiety/depression, and known lumbar radiculopathy who presents for follow-up of low back pain. The patient presents with 18 months of ongoing back pain and known disc extrusion, and has failed several conservative measures. Recently met with neurosurgeon Dr. Evans who offered surgery as last resort. On exam today, patient lacks any evidence of neurologic compromise although there was some hyperreflexia throughout upper and lower extremities. There are no red flags in her history or on physical exam. On exam, there is significant left lumbar paraspinal hypertonicity and trigger points that reproduce her pain. Positive ELVIE on left, however negative SI joint tenderness on palpation. Physical exam and symptoms are consistent with known disc herniation and reactive myofascial pain, and additional SI joint dysfunction. The patient would like to follow the plan outlined below before undergoing surgery:    -Left ultrasound-guided SI joint injection performed as above.  There were no complications and she tolerated the procedure well.  She was provided with postprocedure instructions.  -Consider other medications in the future  -Continue working on your core exercises, this is the most important  -Proceed with surgery as a last resort  -Follow-up 4-6 weeks      The patient expressed understanding and agreement with the assessment and plan. Patient encouraged to contact us should they have any questions, concerns, or any changes in symptoms.      Thank you for allowing me to participate in the care of your patient.       ** Dictated with voice recognition software, please forgive any errors in grammar and/or spelling **

## 2025-02-04 NOTE — PATIENT INSTRUCTIONS
-Ice on and off for the next 24 hours if injection sites are sore. Do gentle range of motion exercises in each area that was injected. Try to do them every hour for about half a minute or so, in every direction that the affected part goes. No pool, bath, or hot tub today. Avoid heavy lifting for the next 2 days.    -Consider other medications in the future  -Continue working on your core exercises, this is the most important  -Proceed with surgery as a last resort  -Follow-up 4-6 weeks

## 2025-02-05 ENCOUNTER — APPOINTMENT (OUTPATIENT)
Dept: PHYSICAL MEDICINE AND REHAB | Facility: CLINIC | Age: 65
End: 2025-02-05
Payer: MEDICARE

## 2025-02-05 ENCOUNTER — HOSPITAL ENCOUNTER (OUTPATIENT)
Dept: RADIOLOGY | Facility: EXTERNAL LOCATION | Age: 65
Discharge: HOME | End: 2025-02-05

## 2025-02-05 VITALS
OXYGEN SATURATION: 98 % | WEIGHT: 115 LBS | TEMPERATURE: 97.3 F | SYSTOLIC BLOOD PRESSURE: 149 MMHG | BODY MASS INDEX: 21.16 KG/M2 | HEIGHT: 62 IN | DIASTOLIC BLOOD PRESSURE: 86 MMHG | HEART RATE: 79 BPM

## 2025-02-05 DIAGNOSIS — M79.18 MYOFASCIAL PAIN: ICD-10-CM

## 2025-02-05 DIAGNOSIS — M54.17 LUMBOSACRAL RADICULOPATHY: ICD-10-CM

## 2025-02-05 DIAGNOSIS — M53.3 SACROILIAC JOINT DYSFUNCTION OF LEFT SIDE: Primary | ICD-10-CM

## 2025-02-05 PROCEDURE — 20611 DRAIN/INJ JOINT/BURSA W/US: CPT | Performed by: PHYSICAL MEDICINE & REHABILITATION

## 2025-02-05 RX ORDER — TRIAMCINOLONE ACETONIDE 40 MG/ML
40 INJECTION, SUSPENSION INTRA-ARTICULAR; INTRAMUSCULAR ONCE
Status: COMPLETED | OUTPATIENT
Start: 2025-02-05 | End: 2025-02-05

## 2025-02-05 RX ADMIN — TRIAMCINOLONE ACETONIDE 40 MG: 40 INJECTION, SUSPENSION INTRA-ARTICULAR; INTRAMUSCULAR at 08:38

## 2025-02-05 ASSESSMENT — PAIN SCALES - GENERAL: PAINLEVEL_OUTOF10: 7

## 2025-02-14 ENCOUNTER — TELEPHONE (OUTPATIENT)
Dept: PHYSICAL MEDICINE AND REHAB | Facility: CLINIC | Age: 65
End: 2025-02-14
Payer: MEDICARE

## 2025-02-14 NOTE — TELEPHONE ENCOUNTER
----- Message from Lily Sabillon sent at 2/13/2025  5:11 PM EST -----  This patient is scheduled for follow-up with me on Monday.  I saw her recently on 2/5 for an injection and wanted to see her in 4 to 6 weeks, not sure why she is coming back so soon, but I would like to see her in 4 to 6 weeks from the 2/5 appointment.  Please help her reschedule.  Thank you

## 2025-02-19 ENCOUNTER — APPOINTMENT (OUTPATIENT)
Dept: PHYSICAL MEDICINE AND REHAB | Facility: CLINIC | Age: 65
End: 2025-02-19
Payer: MEDICARE

## 2025-02-27 DIAGNOSIS — Z00.00 HEALTHCARE MAINTENANCE: ICD-10-CM

## 2025-02-27 DIAGNOSIS — M79.604 LUMBAR PAIN WITH RADIATION DOWN BOTH LEGS: ICD-10-CM

## 2025-02-27 DIAGNOSIS — M79.605 LUMBAR PAIN WITH RADIATION DOWN BOTH LEGS: ICD-10-CM

## 2025-02-27 DIAGNOSIS — M54.50 LUMBAR PAIN WITH RADIATION DOWN BOTH LEGS: ICD-10-CM

## 2025-02-27 DIAGNOSIS — M54.17 LUMBOSACRAL RADICULOPATHY: ICD-10-CM

## 2025-03-06 ENCOUNTER — APPOINTMENT (OUTPATIENT)
Dept: PRIMARY CARE | Facility: CLINIC | Age: 65
End: 2025-03-06
Payer: MEDICARE

## 2025-03-06 VITALS
HEART RATE: 78 BPM | OXYGEN SATURATION: 99 % | SYSTOLIC BLOOD PRESSURE: 145 MMHG | DIASTOLIC BLOOD PRESSURE: 80 MMHG | TEMPERATURE: 98.6 F

## 2025-03-06 DIAGNOSIS — B34.9 VIRAL INFECTION: Primary | ICD-10-CM

## 2025-03-06 DIAGNOSIS — M54.50 CHRONIC BILATERAL LOW BACK PAIN WITHOUT SCIATICA: ICD-10-CM

## 2025-03-06 DIAGNOSIS — F51.02 INSOMNIA DUE TO STRESS: ICD-10-CM

## 2025-03-06 DIAGNOSIS — R61 NIGHT SWEATS: ICD-10-CM

## 2025-03-06 DIAGNOSIS — G89.29 CHRONIC BILATERAL LOW BACK PAIN WITHOUT SCIATICA: ICD-10-CM

## 2025-03-06 PROCEDURE — G2211 COMPLEX E/M VISIT ADD ON: HCPCS | Performed by: INTERNAL MEDICINE

## 2025-03-06 PROCEDURE — 3077F SYST BP >= 140 MM HG: CPT | Performed by: INTERNAL MEDICINE

## 2025-03-06 PROCEDURE — 99214 OFFICE O/P EST MOD 30 MIN: CPT | Performed by: INTERNAL MEDICINE

## 2025-03-06 PROCEDURE — 3079F DIAST BP 80-89 MM HG: CPT | Performed by: INTERNAL MEDICINE

## 2025-03-06 RX ORDER — VALACYCLOVIR HYDROCHLORIDE 500 MG/1
500 TABLET, FILM COATED ORAL 2 TIMES DAILY
Qty: 14 TABLET | Refills: 0 | Status: SHIPPED | OUTPATIENT
Start: 2025-03-06 | End: 2025-03-13

## 2025-03-06 RX ORDER — ALPRAZOLAM 0.25 MG/1
0.25 TABLET ORAL NIGHTLY PRN
Qty: 90 TABLET | Refills: 0 | Status: SHIPPED | OUTPATIENT
Start: 2025-03-06 | End: 2025-06-04

## 2025-03-06 NOTE — PATIENT INSTRUCTIONS
Your night sweats sounds viral   Labs and xray ordered  Valtrex 500 mg twice a day for 7 days     See you in April for your physical   Your xanax was refilled.     Agree with pain management referral to Dr. Osito Ignacio   For the medial block procedure for possible ablation     Please recheck your blood pressure   3x at a reading for the next few weeks  Keep a log with bp and pulse   Message me the log  Thanks

## 2025-03-06 NOTE — PROGRESS NOTES
Subjective   Patient ID: Rachael Meredith is a 65 y.o. female who presents for No chief complaint on file..    HPI     Here for a medical discussion:      Back pain  Night sweats  Osteoporosis   update      Continued back pain. Has consulted with Jessica Gong and Nathan-  Dr. Gong does no feel her pain is radicular and suggested medial branch block of the L3-4  to see if this provides any relief  for ablation ablation possibly with Dr. Ignacio. She has already had trigger point injections , SI joint injections with Dr. Sabillon and with Dr. Rodriguez - bilateral L 3 transforaminal injections  X2 last year   None of these injections have helped.     She has opted not to pursue surgery at this point and did establish with a new physical therapist at [facility]   She would also like to try Accupuncture       In addition three weeks ago started with night sweats to the point she needs to change her clothes   Last couple of days they have been okay. She thought she was getting a cold sore and took Valtrex  a few days ago.  She also has a rash like  bug bites/itchy, random places.  No sore throat, no respiatory things. No weight loss, no cough,    Lately more run down and Woozy.  No recent travel, no new medicaitons.      Osteoporosis: now on rolexifene.    Next bone density is November 2025.          Review of Systems    Objective   /80   Pulse 78   Temp 37 °C (98.6 °F)   SpO2 99%     Physical Exam  Vitals reviewed.   Constitutional:       General: She is not in acute distress.     Appearance: Normal appearance. She is not ill-appearing or toxic-appearing.   HENT:      Mouth/Throat:      Mouth: Mucous membranes are moist.      Pharynx: No oropharyngeal exudate or posterior oropharyngeal erythema.   Eyes:      Conjunctiva/sclera: Conjunctivae normal.   Cardiovascular:      Rate and Rhythm: Normal rate and regular rhythm.      Heart sounds: No murmur heard.     No gallop.   Pulmonary:      Effort: Pulmonary effort is normal. No  respiratory distress.      Breath sounds: Normal breath sounds. No wheezing, rhonchi or rales.   Abdominal:      Palpations: Abdomen is soft. There is no mass.      Comments: No HSM   Musculoskeletal:         General: Normal range of motion.   Lymphadenopathy:      Head:      Right side of head: No submandibular adenopathy.      Left side of head: No submandibular adenopathy.      Cervical: No cervical adenopathy.      Upper Body:      Right upper body: No supraclavicular adenopathy.      Left upper body: No supraclavicular adenopathy.      Lower Body: No right inguinal adenopathy. No left inguinal adenopathy.   Skin:     Findings: Rash (few scattered papules) present.   Neurological:      General: No focal deficit present.      Mental Status: She is alert.         Assessment/Plan   Your night sweats sounds viral   Labs and chest xray ordered  Valtrex 500 mg twice a day for 7 days     Agree with pain management referral to Dr. Osito Ignacio   For the medial block procedure for possible ablation     Please recheck your blood pressure   3x at a reading for the next few weeks  Keep a log with bp and pulse   Message me the log      TVT of 45 minutes with greater than 50% spent FTF in assessment/discussion and care coordination

## 2025-03-07 ENCOUNTER — HOSPITAL ENCOUNTER (OUTPATIENT)
Dept: RADIOLOGY | Facility: HOSPITAL | Age: 65
Discharge: HOME | End: 2025-03-07
Payer: MEDICARE

## 2025-03-07 ENCOUNTER — TELEPHONE (OUTPATIENT)
Dept: PRIMARY CARE | Facility: CLINIC | Age: 65
End: 2025-03-07

## 2025-03-07 ENCOUNTER — LAB (OUTPATIENT)
Dept: LAB | Facility: HOSPITAL | Age: 65
End: 2025-03-07
Payer: MEDICARE

## 2025-03-07 DIAGNOSIS — R61 NIGHT SWEATS: ICD-10-CM

## 2025-03-07 DIAGNOSIS — M54.50 CHRONIC BILATERAL LOW BACK PAIN WITHOUT SCIATICA: Primary | ICD-10-CM

## 2025-03-07 DIAGNOSIS — Z00.00 HEALTHCARE MAINTENANCE: ICD-10-CM

## 2025-03-07 DIAGNOSIS — G89.29 CHRONIC BILATERAL LOW BACK PAIN WITHOUT SCIATICA: Primary | ICD-10-CM

## 2025-03-07 DIAGNOSIS — R61 GENERALIZED HYPERHIDROSIS: Primary | ICD-10-CM

## 2025-03-07 LAB
ALBUMIN SERPL BCP-MCNC: 5.1 G/DL (ref 3.4–5)
ALP SERPL-CCNC: 57 U/L (ref 33–136)
ALT SERPL W P-5'-P-CCNC: 25 U/L (ref 7–45)
ANION GAP SERPL CALC-SCNC: 15 MMOL/L (ref 10–20)
AST SERPL W P-5'-P-CCNC: 23 U/L (ref 9–39)
BASOPHILS # BLD AUTO: 0.03 X10*3/UL (ref 0–0.1)
BASOPHILS NFR BLD AUTO: 0.5 %
BILIRUB SERPL-MCNC: 0.4 MG/DL (ref 0–1.2)
BUN SERPL-MCNC: 19 MG/DL (ref 6–23)
CALCIUM SERPL-MCNC: 10.2 MG/DL (ref 8.6–10.3)
CHLORIDE SERPL-SCNC: 96 MMOL/L (ref 98–107)
CO2 SERPL-SCNC: 25 MMOL/L (ref 21–32)
CREAT SERPL-MCNC: 0.85 MG/DL (ref 0.5–1.05)
EGFRCR SERPLBLD CKD-EPI 2021: 75 ML/MIN/1.73M*2
EOSINOPHIL # BLD AUTO: 0.09 X10*3/UL (ref 0–0.7)
EOSINOPHIL NFR BLD AUTO: 1.6 %
ERYTHROCYTE [DISTWIDTH] IN BLOOD BY AUTOMATED COUNT: 12.7 % (ref 11.5–14.5)
GLUCOSE SERPL-MCNC: 108 MG/DL (ref 74–99)
HCT VFR BLD AUTO: 40.7 % (ref 36–46)
HGB BLD-MCNC: 13.5 G/DL (ref 12–16)
IMM GRANULOCYTES # BLD AUTO: 0.02 X10*3/UL (ref 0–0.7)
IMM GRANULOCYTES NFR BLD AUTO: 0.4 % (ref 0–0.9)
LYMPHOCYTES # BLD AUTO: 1.61 X10*3/UL (ref 1.2–4.8)
LYMPHOCYTES NFR BLD AUTO: 29.2 %
MCH RBC QN AUTO: 28.6 PG (ref 26–34)
MCHC RBC AUTO-ENTMCNC: 33.2 G/DL (ref 32–36)
MCV RBC AUTO: 86 FL (ref 80–100)
MONOCYTES # BLD AUTO: 0.43 X10*3/UL (ref 0.1–1)
MONOCYTES NFR BLD AUTO: 7.8 %
NEUTROPHILS # BLD AUTO: 3.34 X10*3/UL (ref 1.2–7.7)
NEUTROPHILS NFR BLD AUTO: 60.5 %
NRBC BLD-RTO: NORMAL /100{WBCS}
PLATELET # BLD AUTO: 276 X10*3/UL (ref 150–450)
POTASSIUM SERPL-SCNC: 4.5 MMOL/L (ref 3.5–5.3)
PROT SERPL-MCNC: 7.4 G/DL (ref 6.4–8.2)
PROT SERPL-MCNC: 7.4 G/DL (ref 6.4–8.2)
RBC # BLD AUTO: 4.72 X10*6/UL (ref 4–5.2)
SODIUM SERPL-SCNC: 131 MMOL/L (ref 136–145)
WBC # BLD AUTO: 5.5 X10*3/UL (ref 4.4–11.3)

## 2025-03-07 PROCEDURE — 80053 COMPREHEN METABOLIC PANEL: CPT

## 2025-03-07 PROCEDURE — 36415 COLL VENOUS BLD VENIPUNCTURE: CPT

## 2025-03-07 PROCEDURE — 84165 PROTEIN E-PHORESIS SERUM: CPT

## 2025-03-07 PROCEDURE — 85025 COMPLETE CBC W/AUTO DIFF WBC: CPT

## 2025-03-07 PROCEDURE — 86334 IMMUNOFIX E-PHORESIS SERUM: CPT

## 2025-03-07 PROCEDURE — 71046 X-RAY EXAM CHEST 2 VIEWS: CPT

## 2025-03-07 PROCEDURE — 84155 ASSAY OF PROTEIN SERUM: CPT

## 2025-03-07 RX ORDER — TIZANIDINE 2 MG/1
TABLET ORAL
Qty: 30 TABLET | Refills: 0 | Status: SHIPPED | OUTPATIENT
Start: 2025-03-07

## 2025-03-07 NOTE — TELEPHONE ENCOUNTER
"Spoke with Rachael. She is currently \"flat on her back\". She has not tried muscle relaxants prior.  Advised her they can cause fatigue and to only use at night or if she is home resting.  Will call in Tizanidine 2 mg for her   "

## 2025-03-07 NOTE — TELEPHONE ENCOUNTER
Patient requesting a prescription for muscle relaxant, she's having some spasms today.    Please send to [facility] [phone]

## 2025-03-12 LAB
ALBUMIN: 4.8 G/DL (ref 3.4–5)
ALPHA 1 GLOBULIN: 0.3 G/DL (ref 0.2–0.6)
ALPHA 2 GLOBULIN: 0.7 G/DL (ref 0.4–1.1)
BETA GLOBULIN: 0.9 G/DL (ref 0.5–1.2)
GAMMA GLOBULIN: 0.7 G/DL (ref 0.5–1.4)
IMMUNOFIXATION COMMENT: NORMAL
PATH REVIEW - SERUM IMMUNOFIXATION: NORMAL
PATH REVIEW-SERUM PROTEIN ELECTROPHORESIS: NORMAL
PROTEIN ELECTROPHORESIS COMMENT: NORMAL

## 2025-03-14 DIAGNOSIS — Z79.899 MEDICATION MANAGEMENT: Primary | ICD-10-CM

## 2025-03-19 ENCOUNTER — APPOINTMENT (OUTPATIENT)
Dept: PHYSICAL MEDICINE AND REHAB | Facility: CLINIC | Age: 65
End: 2025-03-19
Payer: MEDICARE

## 2025-03-24 ENCOUNTER — APPOINTMENT (OUTPATIENT)
Dept: PHYSICAL MEDICINE AND REHAB | Facility: CLINIC | Age: 65
End: 2025-03-24
Payer: MEDICARE

## 2025-03-25 DIAGNOSIS — Z00.00 HEALTH MAINTENANCE EXAMINATION: ICD-10-CM

## 2025-03-25 DIAGNOSIS — I10 PRIMARY HYPERTENSION: Primary | ICD-10-CM

## 2025-03-25 RX ORDER — LOSARTAN POTASSIUM 100 MG/1
100 TABLET ORAL DAILY
Qty: 90 TABLET | Refills: 3 | Status: SHIPPED | OUTPATIENT
Start: 2025-03-25 | End: 2026-03-25

## 2025-04-01 DIAGNOSIS — Z00.00 HEALTHCARE MAINTENANCE: ICD-10-CM

## 2025-04-04 ENCOUNTER — LAB (OUTPATIENT)
Dept: LAB | Facility: HOSPITAL | Age: 65
End: 2025-04-04
Payer: MEDICARE

## 2025-04-04 LAB
APPEARANCE UR: CLEAR
BILIRUB UR STRIP.AUTO-MCNC: NEGATIVE MG/DL
COLOR UR: COLORLESS
GLUCOSE UR STRIP.AUTO-MCNC: NORMAL MG/DL
KETONES UR STRIP.AUTO-MCNC: NEGATIVE MG/DL
LEUKOCYTE ESTERASE UR QL STRIP.AUTO: NEGATIVE
NITRITE UR QL STRIP.AUTO: NEGATIVE
PH UR STRIP.AUTO: 7 [PH]
PROT UR STRIP.AUTO-MCNC: NEGATIVE MG/DL
RBC # UR STRIP.AUTO: NEGATIVE MG/DL
SP GR UR STRIP.AUTO: 1.01
UROBILINOGEN UR STRIP.AUTO-MCNC: NORMAL MG/DL

## 2025-04-04 PROCEDURE — 81003 URINALYSIS AUTO W/O SCOPE: CPT

## 2025-04-05 LAB — HOLD SPECIMEN: NORMAL

## 2025-04-07 LAB
MEV IGG SER IA-ACNC: >300 AU/ML
MUV IGG SER IA-ACNC: 71.6 AU/ML
RUBV IGG SERPL IA-ACNC: 1.31 INDEX

## 2025-04-10 ENCOUNTER — LAB (OUTPATIENT)
Dept: LAB | Facility: HOSPITAL | Age: 65
End: 2025-04-10
Payer: MEDICARE

## 2025-04-10 DIAGNOSIS — Z00.00 ENCOUNTER FOR GENERAL ADULT MEDICAL EXAMINATION WITHOUT ABNORMAL FINDINGS: Primary | ICD-10-CM

## 2025-04-10 DIAGNOSIS — E87.1 HYPONATREMIA: Primary | ICD-10-CM

## 2025-04-10 LAB
ALBUMIN SERPL BCP-MCNC: 4.7 G/DL (ref 3.4–5)
ALP SERPL-CCNC: 49 U/L (ref 33–136)
ALT SERPL W P-5'-P-CCNC: 17 U/L (ref 7–45)
ANION GAP SERPL CALC-SCNC: 14 MMOL/L (ref 10–20)
AST SERPL W P-5'-P-CCNC: 18 U/L (ref 9–39)
BASOPHILS # BLD AUTO: 0.03 X10*3/UL (ref 0–0.1)
BASOPHILS NFR BLD AUTO: 0.4 %
BILIRUB SERPL-MCNC: 0.4 MG/DL (ref 0–1.2)
BUN SERPL-MCNC: 18 MG/DL (ref 6–23)
CALCIUM SERPL-MCNC: 9.6 MG/DL (ref 8.6–10.3)
CHLORIDE SERPL-SCNC: 93 MMOL/L (ref 98–107)
CHOLEST SERPL-MCNC: 233 MG/DL (ref 0–199)
CHOLESTEROL/HDL RATIO: 2.2
CO2 SERPL-SCNC: 26 MMOL/L (ref 21–32)
CREAT SERPL-MCNC: 0.77 MG/DL (ref 0.5–1.05)
CRP SERPL HS-MCNC: 0.6 MG/L
EGFRCR SERPLBLD CKD-EPI 2021: 85 ML/MIN/1.73M*2
EOSINOPHIL # BLD AUTO: 0.12 X10*3/UL (ref 0–0.7)
EOSINOPHIL NFR BLD AUTO: 1.7 %
ERYTHROCYTE [DISTWIDTH] IN BLOOD BY AUTOMATED COUNT: 13.3 % (ref 11.5–14.5)
EST. AVERAGE GLUCOSE BLD GHB EST-MCNC: 120 MG/DL
GLUCOSE SERPL-MCNC: 102 MG/DL (ref 74–99)
HBA1C MFR BLD: 5.8 %
HCT VFR BLD AUTO: 40.3 % (ref 36–46)
HDLC SERPL-MCNC: 105.1 MG/DL
HGB BLD-MCNC: 13.2 G/DL (ref 12–16)
IMM GRANULOCYTES # BLD AUTO: 0.04 X10*3/UL (ref 0–0.7)
IMM GRANULOCYTES NFR BLD AUTO: 0.6 % (ref 0–0.9)
LDLC SERPL CALC-MCNC: 112 MG/DL
LYMPHOCYTES # BLD AUTO: 1.85 X10*3/UL (ref 1.2–4.8)
LYMPHOCYTES NFR BLD AUTO: 25.5 %
MCH RBC QN AUTO: 28.3 PG (ref 26–34)
MCHC RBC AUTO-ENTMCNC: 32.8 G/DL (ref 32–36)
MCV RBC AUTO: 87 FL (ref 80–100)
MONOCYTES # BLD AUTO: 0.7 X10*3/UL (ref 0.1–1)
MONOCYTES NFR BLD AUTO: 9.7 %
NEUTROPHILS # BLD AUTO: 4.51 X10*3/UL (ref 1.2–7.7)
NEUTROPHILS NFR BLD AUTO: 62.1 %
NON HDL CHOLESTEROL: 128 MG/DL (ref 0–149)
NRBC BLD-RTO: NORMAL /100{WBCS}
PLATELET # BLD AUTO: 278 X10*3/UL (ref 150–450)
POTASSIUM SERPL-SCNC: 4.6 MMOL/L (ref 3.5–5.3)
PROT SERPL-MCNC: 6.7 G/DL (ref 6.4–8.2)
RBC # BLD AUTO: 4.66 X10*6/UL (ref 4–5.2)
SODIUM SERPL-SCNC: 128 MMOL/L (ref 136–145)
TRIGL SERPL-MCNC: 80 MG/DL (ref 0–149)
VLDL: 16 MG/DL (ref 0–40)
WBC # BLD AUTO: 7.3 X10*3/UL (ref 4.4–11.3)

## 2025-04-10 PROCEDURE — 83036 HEMOGLOBIN GLYCOSYLATED A1C: CPT

## 2025-04-10 PROCEDURE — 84443 ASSAY THYROID STIM HORMONE: CPT

## 2025-04-10 PROCEDURE — 36415 COLL VENOUS BLD VENIPUNCTURE: CPT

## 2025-04-10 PROCEDURE — 86141 C-REACTIVE PROTEIN HS: CPT

## 2025-04-10 PROCEDURE — 82306 VITAMIN D 25 HYDROXY: CPT

## 2025-04-10 PROCEDURE — 83930 ASSAY OF BLOOD OSMOLALITY: CPT

## 2025-04-10 PROCEDURE — 80053 COMPREHEN METABOLIC PANEL: CPT

## 2025-04-10 PROCEDURE — 80061 LIPID PANEL: CPT

## 2025-04-10 PROCEDURE — 85025 COMPLETE CBC W/AUTO DIFF WBC: CPT

## 2025-04-11 LAB
25(OH)D3 SERPL-MCNC: 39 NG/ML (ref 30–100)
OSMOLALITY SERPL: 271 MOSM/KG (ref 280–300)
TSH SERPL-ACNC: 2.34 MIU/L (ref 0.44–3.98)

## 2025-04-14 ENCOUNTER — LAB (OUTPATIENT)
Dept: LAB | Facility: HOSPITAL | Age: 65
End: 2025-04-14
Payer: MEDICARE

## 2025-04-14 ENCOUNTER — APPOINTMENT (OUTPATIENT)
Dept: LAB | Facility: HOSPITAL | Age: 65
End: 2025-04-14
Payer: MEDICARE

## 2025-04-14 DIAGNOSIS — Z00.00 ENCOUNTER FOR GENERAL ADULT MEDICAL EXAMINATION WITHOUT ABNORMAL FINDINGS: Primary | ICD-10-CM

## 2025-04-14 DIAGNOSIS — R61 GENERALIZED HYPERHIDROSIS: Primary | ICD-10-CM

## 2025-04-14 PROCEDURE — 80053 COMPREHEN METABOLIC PANEL: CPT

## 2025-04-14 PROCEDURE — 85025 COMPLETE CBC W/AUTO DIFF WBC: CPT

## 2025-04-17 ENCOUNTER — LAB REQUISITION (OUTPATIENT)
Dept: LAB | Facility: HOSPITAL | Age: 65
End: 2025-04-17
Payer: MEDICARE

## 2025-04-17 LAB
ALBUMIN SERPL BCP-MCNC: 4.6 G/DL (ref 3.4–5)
ALP SERPL-CCNC: 54 U/L (ref 33–136)
ALT SERPL W P-5'-P-CCNC: 19 U/L (ref 7–45)
ANION GAP SERPL CALC-SCNC: 14 MMOL/L (ref 10–20)
AST SERPL W P-5'-P-CCNC: 21 U/L (ref 9–39)
BASOPHILS # BLD AUTO: 0.04 X10*3/UL (ref 0–0.1)
BASOPHILS NFR BLD AUTO: 0.6 %
BILIRUB SERPL-MCNC: 0.4 MG/DL (ref 0–1.2)
BUN SERPL-MCNC: 13 MG/DL (ref 6–23)
CALCIUM SERPL-MCNC: 9.4 MG/DL (ref 8.6–10.3)
CHLORIDE SERPL-SCNC: 95 MMOL/L (ref 98–107)
CO2 SERPL-SCNC: 25 MMOL/L (ref 21–32)
CREAT SERPL-MCNC: 0.72 MG/DL (ref 0.5–1.05)
CREAT UR-MCNC: 23 MG/DL (ref 20–275)
EGFRCR SERPLBLD CKD-EPI 2021: >90 ML/MIN/1.73M*2
EOSINOPHIL # BLD AUTO: 0.12 X10*3/UL (ref 0–0.7)
EOSINOPHIL NFR BLD AUTO: 1.7 %
ERYTHROCYTE [DISTWIDTH] IN BLOOD BY AUTOMATED COUNT: 13.3 % (ref 11.5–14.5)
GLUCOSE SERPL-MCNC: 99 MG/DL (ref 74–99)
HCT VFR BLD AUTO: 38.2 % (ref 36–46)
HGB BLD-MCNC: 12.5 G/DL (ref 12–16)
IMM GRANULOCYTES # BLD AUTO: 0.02 X10*3/UL (ref 0–0.7)
IMM GRANULOCYTES NFR BLD AUTO: 0.3 % (ref 0–0.9)
LYMPHOCYTES # BLD AUTO: 2.18 X10*3/UL (ref 1.2–4.8)
LYMPHOCYTES NFR BLD AUTO: 31.5 %
MCH RBC QN AUTO: 28.5 PG (ref 26–34)
MCHC RBC AUTO-ENTMCNC: 32.7 G/DL (ref 32–36)
MCV RBC AUTO: 87 FL (ref 80–100)
MONOCYTES # BLD AUTO: 0.78 X10*3/UL (ref 0.1–1)
MONOCYTES NFR BLD AUTO: 11.3 %
NEUTROPHILS # BLD AUTO: 3.77 X10*3/UL (ref 1.2–7.7)
NEUTROPHILS NFR BLD AUTO: 54.6 %
OSMOLALITY UR: 198 MOSM/KG (ref 50–1200)
PLATELET # BLD AUTO: 272 X10*3/UL (ref 150–450)
PMV BLD AUTO: 11.4 FL (ref 7.5–11.5)
POTASSIUM SERPL-SCNC: 4.4 MMOL/L (ref 3.5–5.3)
PROT SERPL-MCNC: 6.6 G/DL (ref 6.4–8.2)
RBC # BLD AUTO: 4.39 X10*6/UL (ref 4–5.2)
SODIUM SERPL-SCNC: 130 MMOL/L (ref 136–145)
SODIUM UR-SCNC: 30 MMOL/L (ref 28–272)
SODIUM/CREAT UR-SRTO: 130 MMOL/G CREAT (ref 28–280)
WBC # BLD AUTO: 6.9 X10*3/UL (ref 4.4–11.3)

## 2025-04-17 NOTE — PROGRESS NOTES
"Physical Exam    Name Rachael Meredith    Date of Service :4/18/2025    Rachael Meredith is a 65 y.o. year old female who is being seen for a Medicare Wellness and [facility] Physical   Health Risk Assessment  In general, health is:  good     Mild HLD -   CCS of O in June of 2024.  Family history positive for premature CAD   Elevated eye pressures but recently improved   HTN  Chronic low back pain - consulted Dr. Stock likely related to dysfunctional mobility at L3-4  on 4/17 s/p Left L2 and L3 medial branch injections to the L3-4 facet joint injections with Dr. Ignacio   Trigger point injections did not help      Depression-  stable on lexapro 5mg and wellbutrin 300 mg   Osteoporosis - followed with rheumatology on Raloxifene     Current exercise habits:   regular   Dietary issues discussed: Yes    Cardiac Risk Assessment  Cardiovascular risk was discussed and, if needed, lifestyle modifications recommended, including nutritional choices, exercise, and elimination of habits contributing to risk. We agreed on a plan to reduce the current cardiovascular risk based on above discussion as needed.  Aspirin use/disuse was discussed after reviewing the updated guidelines :    Hearing difficulties: No    Visual Acuity assessed: no    In the past year have you fallen or had a near fall?:No    Activities of Daily Living  Needs help with grocery shopping, cooking, housework, bathing, grooming, dressing, eating, sitting or standing, walking, using the toilet, handling finances, taking medications, using the telephone, or driving:  No     Safe in current home environment: yes  Concerns with balance:  No     Following safety precautions in the home environment and vehicle: removed throw rugs from floors, installed grab bars in the bathroom, handrails in stairwells, having adequate lighting, wearing seatbelt at all times?:  Yes     Depression Screen  (Note: if answer to either of the following is \"Yes\", then a more complete depression " screening is indicated)   Q1: Over the past two weeks, have you felt down, depressed or hopeless? No  Q2: Over the past two weeks, have you felt little interest or pleasure in doing things? No    Social History[1]  Social History     Social History Narrative    . She exercises almost everyday. no tobacco, ETOH two to three nightly    Two adult children: Her daughter, is an ATTY at [employer], Her son  is in finance in [place]     Average alcohol consumption: nightly a drink or two    Current Providers  Specialists: I have reviewed specialist-related care of the patient in the medical record.    Opioid use review  Patient use of opioids:  No      Cognitive screening  Mini Cog Score:  5/5     Cognitive screening reviewed and plan:  n/a     Functional Observation  Was the patient's timed Up & Go test unsteady or >= 12 seconds?  No      Advance Care Planning  End of Life planning discussed, including patient's advanced directive wishes:  No     ---------------      Medical/Family history review  Reviewed and updated problem list, medical/surgical/family/social history, medications, and allergies.    Patient Active Problem List  Diagnosis    Benign essential hypertension    Borderline glaucoma    Glucose intolerance (impaired glucose tolerance)    Insomnia due to stress    Osteoporosis    Functional bowel disorder    Osteoporosis, post-menopausal    Myalgia    Osteoarthritis of right knee    Cough    Lumbosacral radiculopathy    Myofascial pain    Sacroiliac joint dysfunction of left side        Past Medical History:  Diagnosis Date    Herpes genitalis     History of torn meniscus of right knee     Unilateral primary osteoarthritis, right knee         Past Surgical History:  Procedure Laterality Date    BREAST BIOPSY      Open     SECTION, CLASSIC      MOUTH SURGERY      Oral Surgery Tooth Extraction         Family History  Problem Relation Name Age of Onset    Heart attack Mother  74    Diabetes Father       Stroke Father      Hypertension Father      Stroke Maternal Grandmother  34        Medications and Supplements  prescribed by me and other practitioners or clinical pharmacist (such as prescriptions, OTC's, herbal therapies and supplements) were reviewed and documented in the medical record.        Current Outpatient Medications:     ALPRAZolam (Xanax) 0.25 mg tablet, Take 1 tablet (0.25 mg) by mouth as needed at bedtime for anxiety., Disp: 90 tablet, Rfl: 0    buPROPion XL (Wellbutrin XL) 300 mg 24 hr tablet, TAKE 1 TABLET (300 MG) BY MOUTH ONCE DAILY IN THE MORNING. DO NOT CRUSH, CHEW, OR SPLIT., Disp: 90 tablet, Rfl: 3    calcium carbonate-vitamin D3 600 mg-10 mcg (400 unit) tablet, Take 1 tablet by mouth once daily., Disp: , Rfl:     cholecalciferol (Vitamin D-3) 25 MCG (1000 UT) capsule, Take 2 capsules (50 mcg) by mouth once daily., Disp: , Rfl:     escitalopram (Lexapro) 5 mg tablet, Take 1 tablet (5 mg) by mouth once daily., Disp: 90 tablet, Rfl: 3    losartan (Cozaar) 100 mg tablet, Take 1 tablet (100 mg) by mouth once daily., Disp: 90 tablet, Rfl: 3    magnesium oxide 400 mg magnesium capsule, Take by mouth once daily., Disp: , Rfl:     raloxifene (Evista) 60 mg tablet, Take 1 tablet (60 mg) by mouth once daily., Disp: , Rfl:     valACYclovir (Valtrex) 1 gram tablet, , Disp: , Rfl:     clobetasol (Temovate) 0.05 % external solution, APPLY TO SCALP DAILY AS DIRECTED (Patient taking differently: As needed), Disp: , Rfl:     tiZANidine (Zanaflex) 2 mg tablet, Take 1 up to tid prn muscle spasm (Patient not taking: Reported on 4/18/2025), Disp: 30 tablet, Rfl: 0    Allergies  Allergen Reactions    Mirtazapine Other and Agitation     Other Reaction(s): Other       ROUTINE:     Immunizations  due to update mmr booster and covid booster     Mammogram: last completed 9/4/24  PAP/GYN EXAM:   2/14/23   neg. HPV neg   7/19 negative HPV neg.  GYN EXAM: 2/14/23   will repeat in Feb. 2025   COLONOSCOPY: 1/2019 repeat  "10 years   DEXA: due November 2025     OPHTHALMOLOGY: current   DERMATOLOGY Dr Brantley   DENTISTRY: current     Her main health concerns today :    She feels well physically and emotionally.  Her back pain is her biggest health issue at this point and she is feeling more optimistic about that as well. She found a good PT who is working with her and she finds this helpful .  Unfortunately none of the injections has helped.      She is no longer having any night sweats.  No cough . Energy level is good. Sleeping okay. Just was at her dermatologist's office     Review of Systems   Constitutional:  Negative for fatigue, fever and unexpected weight change.   HENT:  Negative for dental problem.    Respiratory:  Negative for cough.    Cardiovascular:  Negative for chest pain.   Gastrointestinal:  Negative for constipation and diarrhea.   Genitourinary:  Negative for hematuria and vaginal bleeding.   Skin: Negative.    Neurological:  Negative for headaches.   Psychiatric/Behavioral:  Negative for dysphoric mood and sleep disturbance. The patient is not nervous/anxious.         /79 (BP Location: Left arm, Patient Position: Sitting)   Pulse 58   Temp 36.8 °C (98.3 °F)   Ht 1.575 m (5' 2\")   Wt 52.2 kg (115 lb)   SpO2 100%   BMI 21.03 kg/m²  Body mass index is 21.03 kg/m².    Physical Exam  Constitutional:       General: She is not in acute distress.     Appearance: Normal appearance. She is not ill-appearing.   HENT:      Right Ear: Tympanic membrane and ear canal normal.      Left Ear: Tympanic membrane and ear canal normal.      Ears:      Comments: Slighly decreased hearing in left ear.   Eyes:      Extraocular Movements: Extraocular movements intact.      Conjunctiva/sclera: Conjunctivae normal.   Cardiovascular:      Rate and Rhythm: Normal rate.      Pulses: Normal pulses.      Heart sounds: Normal heart sounds. No murmur heard.  Pulmonary:      Effort: Pulmonary effort is normal. No respiratory distress.      " Breath sounds: Normal breath sounds. No wheezing, rhonchi or rales.   Chest:   Breasts:     Right: Normal. No mass, nipple discharge or skin change.      Left: Normal. No mass, nipple discharge or skin change.   Abdominal:      General: Bowel sounds are normal. There is no distension.      Palpations: There is no mass.      Tenderness: There is no abdominal tenderness. There is no guarding.   Musculoskeletal:         General: Normal range of motion.   Lymphadenopathy:      Cervical: No cervical adenopathy.      Upper Body:      Right upper body: No supraclavicular or axillary adenopathy.      Left upper body: No supraclavicular or axillary adenopathy.      Lower Body: No right inguinal adenopathy. No left inguinal adenopathy.   Skin:     General: Skin is warm and dry.      Findings: No rash.      Comments: No suspicious rashes or lesions   Neurological:      General: No focal deficit present.      Mental Status: She is alert.   Psychiatric:         Mood and Affect: Mood normal.         RESULTS/DATA:  Reviewed Standard Labs for this physical with patient ( any significant issues addressed in A/P )     ECG: normal sinus rhythm, no blocks or conduction defects, no ischemic changes.       Assessment/Plan     Hyponatremia-  suspect medication related.  She is asymptomatic and counseled on staying around 60 ounces of water and not more and to eat regular meals.  We discussed that we will hold off on any further evaluations for this at this point. recheck your sodium level in 3 months   Back Pain-  continue PT and core exercises   Depression-stable no changes to medications  HTN- at goal on losartan 100 mg   Mild decreased hearing in left ear- audiology referral     Routine:   MMR  booster and covid booster due   Bone density in November of 2025   Mammogram after 9/4/25  Galleri information given   GYN exam completed today   Audiology referral placed       Sharon Welch MD                 [1]   Social History  Tobacco Use     Smoking status: Former     Current packs/day: 0.00     Types: Cigarettes     Quit date:      Years since quittin.3    Smokeless tobacco: Never   Substance Use Topics    Alcohol use: Yes     Comment: socially    Drug use: Never

## 2025-04-18 ENCOUNTER — APPOINTMENT (OUTPATIENT)
Dept: PRIMARY CARE | Facility: CLINIC | Age: 65
End: 2025-04-18
Payer: MEDICARE

## 2025-04-18 VITALS
SYSTOLIC BLOOD PRESSURE: 125 MMHG | WEIGHT: 115 LBS | BODY MASS INDEX: 21.16 KG/M2 | DIASTOLIC BLOOD PRESSURE: 79 MMHG | HEIGHT: 62 IN | TEMPERATURE: 98.3 F | HEART RATE: 58 BPM | OXYGEN SATURATION: 100 %

## 2025-04-18 DIAGNOSIS — Z00.00 ANNUAL PHYSICAL EXAM: ICD-10-CM

## 2025-04-18 DIAGNOSIS — I10 BENIGN ESSENTIAL HYPERTENSION: ICD-10-CM

## 2025-04-18 DIAGNOSIS — H91.92 DECREASED HEARING OF LEFT EAR: ICD-10-CM

## 2025-04-18 DIAGNOSIS — Z00.00 ENCOUNTER FOR MEDICARE ANNUAL WELLNESS EXAM: Primary | ICD-10-CM

## 2025-04-18 DIAGNOSIS — Z12.31 ENCOUNTER FOR SCREENING MAMMOGRAM FOR MALIGNANT NEOPLASM OF BREAST: ICD-10-CM

## 2025-04-18 DIAGNOSIS — M81.0 OSTEOPOROSIS, POST-MENOPAUSAL: ICD-10-CM

## 2025-04-18 PROBLEM — M54.17 LUMBOSACRAL RADICULOPATHY: Status: RESOLVED | Noted: 2024-09-11 | Resolved: 2025-04-18

## 2025-04-18 PROBLEM — F51.02 INSOMNIA DUE TO STRESS: Status: RESOLVED | Noted: 2023-06-26 | Resolved: 2025-04-18

## 2025-04-18 PROBLEM — K59.9 FUNCTIONAL BOWEL DISORDER: Status: RESOLVED | Noted: 2023-06-26 | Resolved: 2025-04-18

## 2025-04-18 PROBLEM — R05.9 COUGH: Status: RESOLVED | Noted: 2024-09-11 | Resolved: 2025-04-18

## 2025-04-18 PROBLEM — M79.10 MYALGIA: Status: RESOLVED | Noted: 2023-11-28 | Resolved: 2025-04-18

## 2025-04-18 ASSESSMENT — ENCOUNTER SYMPTOMS
NERVOUS/ANXIOUS: 0
HEADACHES: 0
FATIGUE: 0
DYSPHORIC MOOD: 0
UNEXPECTED WEIGHT CHANGE: 0
SLEEP DISTURBANCE: 0
HEMATURIA: 0
DIARRHEA: 0
CONSTIPATION: 0
FEVER: 0
COUGH: 0

## 2025-04-18 NOTE — PATIENT INSTRUCTIONS
Hyponatremia-  feel free to use salt liberally.  We discussed that we will hold off on any further evaluations for this at this point. If you start to have any unexplained symptoms or concerns please let me know. Otherwise lets just recheck your sodium level in 3 months     Routine:   MMR  booster and covid booster due   Bone density in November of 2025

## 2025-04-22 NOTE — PROGRESS NOTES
ADULT AUDIOLOGY EVALUATION  POP- SLIGHTLY DECREASED HEARING IN L EAR  Name:  Rachael Meredith  :  1960  Age:  65 y.o.  Date of Evaluation:  ***    IMPRESSIONS     Today's test results indicate normal hearing sensitivity in ***. Tympanometry indicates normal middle ear functioning ***. Word recognition is *** in both ears. Speech-in-noise testing demonstrated *** difficulty understanding speech in the presence of background noise in {whichqsin:80327}    RECOMMENDATIONS     {medfu:50616}  Return for annual hearing evaluation or sooner should concerns arise.    Time: ***    HISTORY     Rachael Meredith is seen today for an audiologic evaluation.***    Patient denied tinnitus, dizziness, aural fullness, recent ear infections, otalgia, otorrhea, history of otologic surgeries or history of loud noise exposure.       TEST RESULTS     Otoscopic Evaluation:  Right Ear: {otos:65711}  Left Ear: {otos:17752}    Tympanometry:   Right Ear: {tymp:31344}  Left Ear: {tymp:76341}    Ipsilateral Acoustic Reflexes:   Right Ear: {acreflex:23566}  Left Ear: {acreflex:22977}    Pure Tone Audiometry (125-8000 Hz):     Right Ear: ***    Left Ear: ***    Speech Audiometry:   Right Ear:    Speech Reception Threshold (SRT) was obtained at *** dBHL using {wordrecm:83593}.   Word Recognition scores were {DESC; EXCELLENT/GOOD/FAIR:82118} ({hearing discrim.:26249}) in quiet when words were presented at *** dBHL using recorded NU-6 word list ordered by difficulty.  Left Ear:    Speech Reception Threshold (SRT) was obtained at *** dBHL using {wordrecm:63252}.   Word Recognition scores were {DESC; EXCELLENT/GOOD/FAIR:53084} ({hearing discrim.:48994}) in quiet when words were presented at *** dBHL using recorded NU-6 word list ordered by difficulty.     QuickSIN:  One QuickSIN list was delivered {onebin:96474} at 70 dBHL  Right Ear: SNR loss of *** (***)  Left Ear: SNR loss of *** (***)   Binaural: SNR loss of *** (***)      SNR Loss Degree of SNR Loss  Expected Improvement with Directional Microphone   0-3 dB Normal/Near normal May hear better than normals hear in noise.   3-7 dB Mild SNR loss  May hear almost as well as normals hear in noise.   7-15 dB Moderate SNR loss Directional microphones will help. Consider array microphone.   > 15 dB Severe SNR Maximum SNR improvement is needed. Consider FM system.         PATIENT EDUCATION     Patient was counseled in regard to findings. ***        Monica Valadez, Margarito, CCC-A  Clinical Audiologist    Degree of Hearing Sensitivity Decibel Range   Within Normal Limits (WNL) 0-25   Mild 26-40   Moderate 41-55   Moderately-Severe 56-70   Severe 71-90   Profound 91+      Key   CNT/DNT Could Not Test/Did Not Test   TM Tympanic Membrane   WNL Within Normal Limits   HA Hearing Aid   SNHL Sensorineural Hearing Loss   CHL Conductive Hearing Loss   NIHL Noise-Induced Hearing Loss   ECV Ear Canal Volume   MLV Monitored Live Voice     AUDIOGRAM

## 2025-04-23 ENCOUNTER — APPOINTMENT (OUTPATIENT)
Dept: AUDIOLOGY | Facility: CLINIC | Age: 65
End: 2025-04-23
Payer: MEDICARE

## 2025-05-14 ENCOUNTER — CLINICAL SUPPORT (OUTPATIENT)
Dept: AUDIOLOGY | Facility: CLINIC | Age: 65
End: 2025-05-14
Payer: MEDICARE

## 2025-05-14 DIAGNOSIS — H90.42 SENSORINEURAL HEARING LOSS (SNHL) OF LEFT EAR WITH UNRESTRICTED HEARING OF RIGHT EAR: Primary | ICD-10-CM

## 2025-05-14 PROCEDURE — 92550 TYMPANOMETRY & REFLEX THRESH: CPT | Mod: 52

## 2025-05-14 PROCEDURE — 92557 COMPREHENSIVE HEARING TEST: CPT

## 2025-05-14 ASSESSMENT — PAIN SCALES - GENERAL: PAINLEVEL_OUTOF10: 0 - NO PAIN

## 2025-05-14 ASSESSMENT — PAIN - FUNCTIONAL ASSESSMENT: PAIN_FUNCTIONAL_ASSESSMENT: 0-10

## 2025-05-14 NOTE — PROGRESS NOTES
AUDIOLOGIC EVALUATION      Name:  Rachael Meredith  :  1960  Age:  65 y.o.  Date of Evaluation:  2025    Time: 3930-7089    HISTORY:  Rachael Meredith, 65 y.o., was seen for an audiologic assessment. She reported that she would like to establish a baseline for hearing sensitivity she does not have any concerns for her hearing. She denied otalgia, otorrhea, tinnitus, dizziness, aural pressure/fullness, history of noise exposure, history of otologic surgeries, family history of hearing loss, and recent falls.       RESULTS:  Otoscopic Evaluation:  Right Ear: Clear Canals  Left Ear: Clear Canals    Immittance Measures:  Right Ear: Type A -- indicating normal volume, pressure, and static compliance  Left Ear: Type A -- indicating normal volume, pressure, and static compliance    Acoustic Reflexes:  Right Ear: (Ipsilateral) Responses present at 500-2000 Hz, and absent at 4000 Hz.  Left Ear: (Ipsilateral) Responses present at 500 Hz, and absent at 1000, 2000, and 4000 Hz.    Pure Tone Audiometry:    Right Ear: Hearing within normal limits 125-8000 Hz    Left Ear: Hearing within normal limits 125-4000 Hz sloping to a mild sensorineural hearing loss at 8000 Hz    Asymmetry: No    Reliability:   Good    Speech Audiometry:   Right: Speech Reception Threshold (SRT) was obtained at 20 dBHL.   SRT/PTA in Good agreement with pure tone average.    Left: Speech Reception Threshold (SRT) was obtained at 15 dBHL.   SRT/PTA in Good agreement with pure tone average.    Word Recognition Scores (WRS):  Right Ear: excellent (100%) in quiet when words were presented at 60 dBHL.   Left Ear: excellent (100%) in quiet when words were presented at 60 dBHL.     Asymmetry: No      IMPRESSIONS:  Today's testing revealed normal ear canal volume, middle ear pressure, and tympanic membrane compliance in both ears. She has hearing within normal limits in the right ear. In the left ear she has hearing within normal limits sloping to a mild sensorineural  hearing loss. She has excellent word recognition in both ears. I would not consider her a hearing aid candidate at this time. The results were discussed with the patient. She should follow-up for annual audiologic assessments.        RECOMMENDATIONS:  Return for audiologic evaluation annually to monitor hearing sensitivity and assess middle ear status or sooner should concerns arise. The audiology department can be reached at [phone] to schedule an appointment.       Margarito Stout, CCC-A  Clinical Audiologist      KEY  SNHL Sensorineural Hearing Loss   CHL Conductive Hearing Loss   MHL Mixed Hearing Loss   SSNHL Sudden Sensorineural Hearing Loss   WNL Within Normal Limits   PTA Pure Tone Average   TM Tympanic Membrane   ECV Ear Canal Volume   SRT Speech Reception Threshold   WRS Word Recognition Score

## 2025-05-28 DIAGNOSIS — F32.A DEPRESSION, UNSPECIFIED DEPRESSION TYPE: ICD-10-CM

## 2025-05-28 RX ORDER — BUPROPION HYDROCHLORIDE 300 MG/1
300 TABLET ORAL EVERY MORNING
Qty: 90 TABLET | Refills: 3 | Status: SHIPPED | OUTPATIENT
Start: 2025-05-28 | End: 2026-05-28

## 2025-08-13 ENCOUNTER — TELEPHONE (OUTPATIENT)
Dept: PRIMARY CARE | Facility: CLINIC | Age: 65
End: 2025-08-13
Payer: MEDICARE

## 2025-08-14 ENCOUNTER — HOSPITAL ENCOUNTER (OUTPATIENT)
Dept: RADIOLOGY | Facility: CLINIC | Age: 65
Discharge: HOME | End: 2025-08-14
Payer: MEDICARE

## 2025-08-14 ENCOUNTER — OFFICE VISIT (OUTPATIENT)
Dept: PRIMARY CARE | Facility: CLINIC | Age: 65
End: 2025-08-14
Payer: MEDICARE

## 2025-08-14 VITALS
WEIGHT: 116.38 LBS | SYSTOLIC BLOOD PRESSURE: 131 MMHG | OXYGEN SATURATION: 99 % | HEIGHT: 62 IN | HEART RATE: 57 BPM | TEMPERATURE: 97.9 F | BODY MASS INDEX: 21.42 KG/M2 | DIASTOLIC BLOOD PRESSURE: 80 MMHG

## 2025-08-14 DIAGNOSIS — R14.0 ABDOMINAL BLOATING: Primary | ICD-10-CM

## 2025-08-14 DIAGNOSIS — K59.01 SLOW TRANSIT CONSTIPATION: ICD-10-CM

## 2025-08-14 DIAGNOSIS — R14.0 ABDOMINAL BLOATING: ICD-10-CM

## 2025-08-14 PROCEDURE — 1159F MED LIST DOCD IN RCRD: CPT | Performed by: INTERNAL MEDICINE

## 2025-08-14 PROCEDURE — 1126F AMNT PAIN NOTED NONE PRSNT: CPT | Performed by: INTERNAL MEDICINE

## 2025-08-14 PROCEDURE — 3075F SYST BP GE 130 - 139MM HG: CPT | Performed by: INTERNAL MEDICINE

## 2025-08-14 PROCEDURE — 74019 RADEX ABDOMEN 2 VIEWS: CPT

## 2025-08-14 PROCEDURE — 3008F BODY MASS INDEX DOCD: CPT | Performed by: INTERNAL MEDICINE

## 2025-08-14 PROCEDURE — 99214 OFFICE O/P EST MOD 30 MIN: CPT | Performed by: INTERNAL MEDICINE

## 2025-08-14 PROCEDURE — 3079F DIAST BP 80-89 MM HG: CPT | Performed by: INTERNAL MEDICINE

## 2025-08-14 RX ORDER — MEASELS, MUMPS, AND RUBELLA VACCINE, LIVE 3.4-4.2
KIT SUBCUTANEOUS
COMMUNITY
Start: 2025-04-21

## 2025-08-14 ASSESSMENT — PAIN SCALES - GENERAL: PAINLEVEL_OUTOF10: 0-NO PAIN

## 2025-08-15 ENCOUNTER — HOSPITAL ENCOUNTER (OUTPATIENT)
Dept: RADIOLOGY | Facility: CLINIC | Age: 65
Discharge: HOME | End: 2025-08-15
Payer: MEDICARE

## 2025-08-15 DIAGNOSIS — K59.01 SLOW TRANSIT CONSTIPATION: ICD-10-CM

## 2025-08-15 DIAGNOSIS — R14.0 ABDOMINAL BLOATING: ICD-10-CM

## 2025-08-15 DIAGNOSIS — F51.02 INSOMNIA DUE TO STRESS: ICD-10-CM

## 2025-08-15 LAB
ALBUMIN SERPL-MCNC: 4.7 G/DL (ref 3.6–5.1)
ALP SERPL-CCNC: 44 U/L (ref 37–153)
ALT SERPL-CCNC: 20 U/L (ref 6–29)
ANION GAP SERPL CALCULATED.4IONS-SCNC: 10 MMOL/L (CALC) (ref 7–17)
AST SERPL-CCNC: 20 U/L (ref 10–35)
BASOPHILS # BLD AUTO: 30 CELLS/UL (ref 0–200)
BASOPHILS NFR BLD AUTO: 0.5 %
BILIRUB SERPL-MCNC: 0.6 MG/DL (ref 0.2–1.2)
BUN SERPL-MCNC: 12 MG/DL (ref 7–25)
CALCIUM SERPL-MCNC: 9.6 MG/DL (ref 8.6–10.4)
CHLORIDE SERPL-SCNC: 96 MMOL/L (ref 98–110)
CO2 SERPL-SCNC: 24 MMOL/L (ref 20–32)
CREAT SERPL-MCNC: 0.77 MG/DL (ref 0.5–1.05)
EGFRCR SERPLBLD CKD-EPI 2021: 84 ML/MIN/1.73M2
EOSINOPHIL # BLD AUTO: 72 CELLS/UL (ref 15–500)
EOSINOPHIL NFR BLD AUTO: 1.2 %
ERYTHROCYTE [DISTWIDTH] IN BLOOD BY AUTOMATED COUNT: 12.5 % (ref 11–15)
ERYTHROCYTE [SEDIMENTATION RATE] IN BLOOD BY WESTERGREN METHOD: 2 MM/H
GLUCOSE SERPL-MCNC: 112 MG/DL (ref 65–99)
HCT VFR BLD AUTO: 38.4 % (ref 35–45)
HGB BLD-MCNC: 12.6 G/DL (ref 11.7–15.5)
LYMPHOCYTES # BLD AUTO: 2178 CELLS/UL (ref 850–3900)
LYMPHOCYTES NFR BLD AUTO: 36.3 %
MCH RBC QN AUTO: 29.2 PG (ref 27–33)
MCHC RBC AUTO-ENTMCNC: 32.8 G/DL (ref 32–36)
MCV RBC AUTO: 88.9 FL (ref 80–100)
MONOCYTES # BLD AUTO: 480 CELLS/UL (ref 200–950)
MONOCYTES NFR BLD AUTO: 8 %
NEUTROPHILS # BLD AUTO: 3240 CELLS/UL (ref 1500–7800)
NEUTROPHILS NFR BLD AUTO: 54 %
PLATELET # BLD AUTO: 243 THOUSAND/UL (ref 140–400)
PMV BLD REES-ECKER: 11 FL (ref 7.5–12.5)
POTASSIUM SERPL-SCNC: 4.3 MMOL/L (ref 3.5–5.3)
PROT SERPL-MCNC: 6.7 G/DL (ref 6.1–8.1)
RBC # BLD AUTO: 4.32 MILLION/UL (ref 3.8–5.1)
SODIUM SERPL-SCNC: 130 MMOL/L (ref 135–146)
TSH SERPL-ACNC: 2.61 MIU/L (ref 0.4–4.5)
WBC # BLD AUTO: 6 THOUSAND/UL (ref 3.8–10.8)

## 2025-08-15 PROCEDURE — 76856 US EXAM PELVIC COMPLETE: CPT

## 2025-08-15 RX ORDER — ALPRAZOLAM 0.25 MG/1
0.25 TABLET ORAL NIGHTLY PRN
Qty: 90 TABLET | Refills: 0 | Status: SHIPPED | OUTPATIENT
Start: 2025-08-15 | End: 2025-11-13

## 2025-09-10 ENCOUNTER — APPOINTMENT (OUTPATIENT)
Dept: RADIOLOGY | Facility: CLINIC | Age: 65
End: 2025-09-10
Payer: MEDICARE